# Patient Record
Sex: MALE | Race: BLACK OR AFRICAN AMERICAN | NOT HISPANIC OR LATINO | Employment: FULL TIME | URBAN - METROPOLITAN AREA
[De-identification: names, ages, dates, MRNs, and addresses within clinical notes are randomized per-mention and may not be internally consistent; named-entity substitution may affect disease eponyms.]

---

## 2017-04-05 ENCOUNTER — HOSPITAL ENCOUNTER (OUTPATIENT)
Dept: LAB | Facility: HOSPITAL | Age: 52
Discharge: HOME | End: 2017-04-05
Attending: NURSE PRACTITIONER | Admitting: NURSE PRACTITIONER

## 2017-04-05 LAB
ALBUMIN SERPL-MCNC: 3.8 G/DL (ref 3.5–4.8)
ALBUMIN/GLOB SERPL: 1 {RATIO} (ref 1–1.7)
ALP SERPL-CCNC: 78 IU/L (ref 32–91)
ALT SERPL-CCNC: 55 IU/L (ref 17–63)
ANION GAP SERPL CALC-SCNC: 14.8 MMOL/L (ref 10–20)
AST SERPL-CCNC: 31 IU/L (ref 15–41)
BILIRUB SERPL-MCNC: 0.6 MG/DL (ref 0.3–1.2)
BUN SERPL-MCNC: 12 MG/DL (ref 8–20)
BUN/CREAT SERPL: 12 (ref 6.2–20.3)
CALCIUM SERPL-MCNC: 9 MG/DL (ref 8.9–10.3)
CHLORIDE SERPL-SCNC: 104 MMOL/L (ref 101–111)
CHOLEST SERPL-MCNC: 207 MG/DL
CHOLEST/HDLC SERPL: 3.3 {RATIO}
CONV CO2: 26 MMOL/L (ref 22–32)
CONV LDL CHOLESTEROL DIRECT: 127 MG/DL (ref 0–100)
CONV TOTAL PROTEIN: 7.5 G/DL (ref 6.1–7.9)
CREAT UR-MCNC: 1 MG/DL (ref 0.7–1.2)
GLOBULIN UR ELPH-MCNC: 3.7 G/DL (ref 2.5–3.8)
GLUCOSE SERPL-MCNC: 132 MG/DL (ref 65–99)
HDLC SERPL-MCNC: 63 MG/DL
LDLC/HDLC SERPL: 2 {RATIO}
LIPID INTERPRETATION: ABNORMAL
POTASSIUM SERPL-SCNC: 3.8 MMOL/L (ref 3.6–5.1)
SODIUM SERPL-SCNC: 141 MMOL/L (ref 136–144)
TRIGL SERPL-MCNC: 72 MG/DL
VLDLC SERPL CALC-MCNC: 16.8 MG/DL

## 2017-07-12 ENCOUNTER — HOSPITAL ENCOUNTER (OUTPATIENT)
Dept: LAB | Facility: HOSPITAL | Age: 52
Discharge: HOME | End: 2017-07-12
Attending: NURSE PRACTITIONER | Admitting: NURSE PRACTITIONER

## 2017-07-12 LAB
ALBUMIN SERPL-MCNC: 3.8 G/DL (ref 3.5–4.8)
ALBUMIN/GLOB SERPL: 0.9 {RATIO} (ref 1–1.7)
ALP SERPL-CCNC: 82 IU/L (ref 32–91)
ALT SERPL-CCNC: 43 IU/L (ref 17–63)
ANION GAP SERPL CALC-SCNC: 14.5 MMOL/L (ref 10–20)
AST SERPL-CCNC: 35 IU/L (ref 15–41)
BASOPHILS # BLD AUTO: 0 10*3/UL (ref 0–0.2)
BASOPHILS NFR BLD AUTO: 1 % (ref 0–2)
BILIRUB SERPL-MCNC: 0.9 MG/DL (ref 0.3–1.2)
BUN SERPL-MCNC: 10 MG/DL (ref 8–20)
BUN/CREAT SERPL: 8.3 (ref 6.2–20.3)
CALCIUM SERPL-MCNC: 9 MG/DL (ref 8.9–10.3)
CHLORIDE SERPL-SCNC: 102 MMOL/L (ref 101–111)
CHOLEST SERPL-MCNC: 204 MG/DL
CHOLEST/HDLC SERPL: 3.1 {RATIO}
CONV CO2: 26 MMOL/L (ref 22–32)
CONV LDL CHOLESTEROL DIRECT: 125 MG/DL (ref 0–100)
CONV TOTAL PROTEIN: 7.9 G/DL (ref 6.1–7.9)
CREAT UR-MCNC: 1.2 MG/DL (ref 0.7–1.2)
DIFFERENTIAL METHOD BLD: (no result)
EOSINOPHIL # BLD AUTO: 0.2 10*3/UL (ref 0–0.3)
EOSINOPHIL # BLD AUTO: 5 % (ref 0–3)
ERYTHROCYTE [DISTWIDTH] IN BLOOD BY AUTOMATED COUNT: 14.4 % (ref 11.5–14.5)
GLOBULIN UR ELPH-MCNC: 4.1 G/DL (ref 2.5–3.8)
GLUCOSE SERPL-MCNC: 137 MG/DL (ref 65–99)
HCT VFR BLD AUTO: 40.8 % (ref 40–54)
HDLC SERPL-MCNC: 66 MG/DL
HGB BLD-MCNC: 13.5 G/DL (ref 14–18)
LDLC/HDLC SERPL: 1.9 {RATIO}
LIPID INTERPRETATION: ABNORMAL
LYMPHOCYTES # BLD AUTO: 1.9 10*3/UL (ref 0.8–4.8)
LYMPHOCYTES NFR BLD AUTO: 38 % (ref 18–42)
MCH RBC QN AUTO: 30.6 PG (ref 26–32)
MCHC RBC AUTO-ENTMCNC: 33.1 G/DL (ref 32–36)
MCV RBC AUTO: 92.6 FL (ref 80–94)
MONOCYTES # BLD AUTO: 0.4 10*3/UL (ref 0.1–1.3)
MONOCYTES NFR BLD AUTO: 8 % (ref 2–11)
NEUTROPHILS # BLD AUTO: 2.4 10*3/UL (ref 2.3–8.6)
NEUTROPHILS NFR BLD AUTO: 48 % (ref 50–75)
NRBC BLD AUTO-RTO: 0 /100{WBCS}
NRBC/RBC NFR BLD MANUAL: 0 10*3/UL
PLATELET # BLD AUTO: 268 10*3/UL (ref 150–450)
PMV BLD AUTO: 9.6 FL (ref 7.4–10.4)
POTASSIUM SERPL-SCNC: 3.5 MMOL/L (ref 3.6–5.1)
PSA SERPL-MCNC: 0.87 NG/ML (ref 0–4)
RBC # BLD AUTO: 4.41 10*6/UL (ref 4.6–6)
SODIUM SERPL-SCNC: 139 MMOL/L (ref 136–144)
TRIGL SERPL-MCNC: 85 MG/DL
VLDLC SERPL CALC-MCNC: 13.2 MG/DL
WBC # BLD AUTO: 5 10*3/UL (ref 4.5–11.5)

## 2017-09-01 ENCOUNTER — HOSPITAL ENCOUNTER (OUTPATIENT)
Dept: LAB | Facility: HOSPITAL | Age: 52
Discharge: HOME | End: 2017-09-01
Attending: NURSE PRACTITIONER | Admitting: NURSE PRACTITIONER

## 2017-09-06 ENCOUNTER — HOSPITAL ENCOUNTER (OUTPATIENT)
Dept: LAB | Facility: HOSPITAL | Age: 52
Discharge: HOME | End: 2017-09-06
Attending: NURSE PRACTITIONER | Admitting: NURSE PRACTITIONER

## 2018-01-11 ENCOUNTER — HOSPITAL ENCOUNTER (OUTPATIENT)
Dept: LAB | Facility: HOSPITAL | Age: 53
Discharge: HOME | End: 2018-01-11
Attending: NURSE PRACTITIONER | Admitting: NURSE PRACTITIONER

## 2018-01-11 LAB
BASOPHILS # BLD AUTO: 0 10*3/UL (ref 0–0.2)
BASOPHILS NFR BLD AUTO: 1 % (ref 0–2)
DIFFERENTIAL METHOD BLD: (no result)
EOSINOPHIL # BLD AUTO: 0.2 10*3/UL (ref 0–0.3)
EOSINOPHIL # BLD AUTO: 4 % (ref 0–3)
ERYTHROCYTE [DISTWIDTH] IN BLOOD BY AUTOMATED COUNT: 14.5 % (ref 11.5–14.5)
HCT VFR BLD AUTO: 38.3 % (ref 40–54)
HGB BLD-MCNC: 12.8 G/DL (ref 14–18)
LYMPHOCYTES # BLD AUTO: 2.1 10*3/UL (ref 0.8–4.8)
LYMPHOCYTES NFR BLD AUTO: 37 % (ref 18–42)
MCH RBC QN AUTO: 30.9 PG (ref 26–32)
MCHC RBC AUTO-ENTMCNC: 33.3 G/DL (ref 32–36)
MCV RBC AUTO: 92.8 FL (ref 80–94)
MONOCYTES # BLD AUTO: 0.5 10*3/UL (ref 0.1–1.3)
MONOCYTES NFR BLD AUTO: 8 % (ref 2–11)
NEUTROPHILS # BLD AUTO: 3 10*3/UL (ref 2.3–8.6)
NEUTROPHILS NFR BLD AUTO: 50 % (ref 50–75)
NRBC BLD AUTO-RTO: 0 /100{WBCS}
NRBC/RBC NFR BLD MANUAL: 0 10*3/UL
PLATELET # BLD AUTO: 245 10*3/UL (ref 150–450)
PMV BLD AUTO: 9.7 FL (ref 7.4–10.4)
RBC # BLD AUTO: 4.13 10*6/UL (ref 4.6–6)
WBC # BLD AUTO: 5.7 10*3/UL (ref 4.5–11.5)

## 2018-01-19 ENCOUNTER — CLINICAL SUPPORT (OUTPATIENT)
Dept: ONCOLOGY | Facility: HOSPITAL | Age: 53
End: 2018-01-19

## 2018-01-19 ENCOUNTER — HOSPITAL ENCOUNTER (OUTPATIENT)
Dept: ONCOLOGY | Facility: HOSPITAL | Age: 53
Discharge: HOME | End: 2018-01-19
Attending: INTERNAL MEDICINE | Admitting: INTERNAL MEDICINE

## 2018-01-19 ENCOUNTER — HOSPITAL ENCOUNTER (OUTPATIENT)
Dept: ONCOLOGY | Facility: CLINIC | Age: 53
Setting detail: INFUSION SERIES
Discharge: HOME | End: 2018-01-19
Attending: INTERNAL MEDICINE | Admitting: INTERNAL MEDICINE

## 2018-01-19 LAB
IRON SATN MFR SERPL: 20 % (ref 20–50)
IRON SERPL-MCNC: 79 UG/DL (ref 45–182)
MAGNESIUM UR-MCNC: 2.15 % (ref 0.5–1.5)
RETICS/RBC NFR MANUAL: 0.08 10*6/UL
TIBC SERPL-MCNC: 392 UG/DL (ref 228–428)
TRANSFERRIN SERPL-MCNC: 280 MG/DL (ref 180–330)

## 2018-01-19 NOTE — PROGRESS NOTES
PATIENTS ONCOLOGY RECORD LOCATED IN [facility]      Subjective     Name:  HAYES COX     Date:  2018  Address:  [address] [id number]  Home: [phone]  :  1965 AGE:  52 y.o.        RECORDS OBTAINED:  Patients Oncology Record is located in [facility]

## 2018-01-22 LAB — HAPTOGLOB SERPL-MCNC: 181 MG/DL (ref 36–195)

## 2018-02-01 ENCOUNTER — HOSPITAL ENCOUNTER (OUTPATIENT)
Dept: ONCOLOGY | Facility: CLINIC | Age: 53
Setting detail: INFUSION SERIES
Discharge: HOME | End: 2018-02-01
Attending: INTERNAL MEDICINE | Admitting: INTERNAL MEDICINE

## 2018-02-01 ENCOUNTER — HOSPITAL ENCOUNTER (OUTPATIENT)
Dept: ONCOLOGY | Facility: HOSPITAL | Age: 53
Discharge: HOME | End: 2018-02-01
Attending: INTERNAL MEDICINE | Admitting: INTERNAL MEDICINE

## 2018-02-01 ENCOUNTER — CLINICAL SUPPORT (OUTPATIENT)
Dept: ONCOLOGY | Facility: HOSPITAL | Age: 53
End: 2018-02-01

## 2018-02-01 LAB
ALBUMIN SERPL-MCNC: 3.6 G/DL (ref 3.5–4.8)
ALBUMIN SERPL-MCNC: 3.9 G/DL (ref 3.5–4.8)
ALBUMIN/GLOB SERPL: 1 {RATIO} (ref 1–1.7)
ALP SERPL-CCNC: 76 IU/L (ref 32–91)
ALPHA1 GLOB FLD ELPH-MCNC: 0.2 GM/DL (ref 0.1–0.4)
ALPHA2 GLOB SERPL ELPH-MCNC: 0.8 GM/DL (ref 0.5–1)
ALT SERPL-CCNC: 61 IU/L (ref 17–63)
ANION GAP SERPL CALC-SCNC: 10.7 MMOL/L (ref 10–20)
AST SERPL-CCNC: 37 IU/L (ref 15–41)
B-GLOBULIN SERPL ELPH-MCNC: 1.3 GM/DL (ref 0.7–1.4)
BILIRUB SERPL-MCNC: 0.7 MG/DL (ref 0.3–1.2)
BUN SERPL-MCNC: 15 MG/DL (ref 8–20)
BUN/CREAT SERPL: 15 (ref 6.2–20.3)
CALCIUM SERPL-MCNC: 9.1 MG/DL (ref 8.9–10.3)
CHLORIDE SERPL-SCNC: 105 MMOL/L (ref 101–111)
CONV CO2: 27 MMOL/L (ref 22–32)
CONV TOTAL PROTEIN: 7.7 G/DL (ref 6.1–7.9)
CONV TOTAL PROTEIN: 7.9 G/DL (ref 6.1–7.9)
CREAT UR-MCNC: 1 MG/DL (ref 0.7–1.2)
GAMMA GLOB SERPL ELPH-MCNC: 1.8 GM/DL (ref 0.6–1.6)
GLOBULIN UR ELPH-MCNC: 4 G/DL (ref 2.5–3.8)
GLUCOSE SERPL-MCNC: 131 MG/DL (ref 65–99)
INSULIN SERPL-ACNC: ABNORMAL U[IU]/ML
POTASSIUM SERPL-SCNC: 3.7 MMOL/L (ref 3.6–5.1)
SODIUM SERPL-SCNC: 139 MMOL/L (ref 136–144)

## 2018-02-01 NOTE — PROGRESS NOTES
PATIENTS ONCOLOGY RECORD LOCATED IN [facility]      Subjective     Name:  HAYES COX     Date:  2018  Address:  [address] [place] [id number]  Home: [phone]  :  1965 AGE:  52 y.o.        RECORDS OBTAINED:  Patients Oncology Record is located in [facility]

## 2018-03-30 ENCOUNTER — HOSPITAL ENCOUNTER (OUTPATIENT)
Dept: PREOP | Facility: HOSPITAL | Age: 53
Setting detail: HOSPITAL OUTPATIENT SURGERY
Discharge: HOME | End: 2018-03-30
Attending: INTERNAL MEDICINE | Admitting: INTERNAL MEDICINE

## 2018-03-30 ENCOUNTER — ON CAMPUS - OUTPATIENT (OUTPATIENT)
Dept: URBAN - METROPOLITAN AREA HOSPITAL 85 | Facility: HOSPITAL | Age: 53
End: 2018-03-30
Payer: COMMERCIAL

## 2018-03-30 DIAGNOSIS — D12.5 BENIGN NEOPLASM OF SIGMOID COLON: ICD-10-CM

## 2018-03-30 DIAGNOSIS — D12.4 BENIGN NEOPLASM OF DESCENDING COLON: ICD-10-CM

## 2018-03-30 DIAGNOSIS — D50.9 IRON DEFICIENCY ANEMIA, UNSPECIFIED: ICD-10-CM

## 2018-03-30 DIAGNOSIS — K63.5 POLYP OF COLON: ICD-10-CM

## 2018-03-30 DIAGNOSIS — Z86.010 PERSONAL HISTORY OF COLONIC POLYPS: ICD-10-CM

## 2018-03-30 PROCEDURE — 45380 COLONOSCOPY AND BIOPSY: CPT | Performed by: INTERNAL MEDICINE

## 2018-04-11 ENCOUNTER — HOSPITAL ENCOUNTER (OUTPATIENT)
Dept: ONCOLOGY | Facility: CLINIC | Age: 53
Setting detail: INFUSION SERIES
Discharge: HOME | End: 2018-04-11
Attending: INTERNAL MEDICINE | Admitting: INTERNAL MEDICINE

## 2018-04-11 ENCOUNTER — CLINICAL SUPPORT (OUTPATIENT)
Dept: ONCOLOGY | Facility: HOSPITAL | Age: 53
End: 2018-04-11

## 2018-04-11 NOTE — PROGRESS NOTES
PATIENTS ONCOLOGY RECORD LOCATED IN [facility]      Subjective     Name:  HAYES COX     Date:  2018  Address:  [address] [place] [id number]  Home: [phone]  :  1965 AGE:  52 y.o.        RECORDS OBTAINED:  Patients Oncology Record is located in [facility]

## 2019-03-20 ENCOUNTER — HOSPITAL ENCOUNTER (OUTPATIENT)
Dept: GENERAL RADIOLOGY | Facility: HOSPITAL | Age: 54
Discharge: HOME | End: 2019-03-20
Attending: ALLERGY & IMMUNOLOGY | Admitting: ALLERGY & IMMUNOLOGY

## 2019-03-26 ENCOUNTER — HOSPITAL ENCOUNTER (OUTPATIENT)
Dept: LAB | Facility: HOSPITAL | Age: 54
Discharge: HOME | End: 2019-03-26
Attending: NURSE PRACTITIONER | Admitting: NURSE PRACTITIONER

## 2019-03-26 LAB
ALBUMIN SERPL-MCNC: 3.7 G/DL (ref 3.5–4.8)
ALBUMIN/GLOB SERPL: 0.9 {RATIO} (ref 1–1.7)
ALP SERPL-CCNC: 99 IU/L (ref 32–91)
ALT SERPL-CCNC: 150 IU/L (ref 17–63)
ANION GAP SERPL CALC-SCNC: 14 MMOL/L (ref 10–20)
AST SERPL-CCNC: 77 IU/L (ref 15–41)
BASOPHILS # BLD AUTO: 0 10*3/UL (ref 0–0.2)
BASOPHILS NFR BLD AUTO: 1 % (ref 0–2)
BILIRUB SERPL-MCNC: 1.1 MG/DL (ref 0.3–1.2)
BUN SERPL-MCNC: 14 MG/DL (ref 8–20)
BUN/CREAT SERPL: 12.7 (ref 6.2–20.3)
CALCIUM SERPL-MCNC: 8.8 MG/DL (ref 8.9–10.3)
CHLORIDE SERPL-SCNC: 103 MMOL/L (ref 101–111)
CHOLEST SERPL-MCNC: 197 MG/DL
CHOLEST/HDLC SERPL: 2.9 {RATIO}
CONV CO2: 26 MMOL/L (ref 22–32)
CONV LDL CHOLESTEROL DIRECT: 110 MG/DL (ref 0–100)
CONV TOTAL PROTEIN: 7.6 G/DL (ref 6.1–7.9)
CREAT UR-MCNC: 1.1 MG/DL (ref 0.7–1.2)
DIFFERENTIAL METHOD BLD: (no result)
EOSINOPHIL # BLD AUTO: 0.1 10*3/UL (ref 0–0.3)
EOSINOPHIL # BLD AUTO: 2 % (ref 0–3)
ERYTHROCYTE [DISTWIDTH] IN BLOOD BY AUTOMATED COUNT: 14.1 % (ref 11.5–14.5)
GLOBULIN UR ELPH-MCNC: 3.9 G/DL (ref 2.5–3.8)
GLUCOSE SERPL-MCNC: 166 MG/DL (ref 65–99)
HCT VFR BLD AUTO: 42.3 % (ref 40–54)
HDLC SERPL-MCNC: 67 MG/DL
HGB BLD-MCNC: 14 G/DL (ref 14–18)
LDLC/HDLC SERPL: 1.6 {RATIO}
LIPID INTERPRETATION: ABNORMAL
LYMPHOCYTES # BLD AUTO: 1.8 10*3/UL (ref 0.8–4.8)
LYMPHOCYTES NFR BLD AUTO: 31 % (ref 18–42)
MCH RBC QN AUTO: 30.6 PG (ref 26–32)
MCHC RBC AUTO-ENTMCNC: 33 G/DL (ref 32–36)
MCV RBC AUTO: 92.7 FL (ref 80–94)
MONOCYTES # BLD AUTO: 0.4 10*3/UL (ref 0.1–1.3)
MONOCYTES NFR BLD AUTO: 7 % (ref 2–11)
NEUTROPHILS # BLD AUTO: 3.5 10*3/UL (ref 2.3–8.6)
NEUTROPHILS NFR BLD AUTO: 59 % (ref 50–75)
NRBC BLD AUTO-RTO: 0 /100{WBCS}
NRBC/RBC NFR BLD MANUAL: 0 10*3/UL
PLATELET # BLD AUTO: 248 10*3/UL (ref 150–450)
PMV BLD AUTO: 10.4 FL (ref 7.4–10.4)
POTASSIUM SERPL-SCNC: 4 MMOL/L (ref 3.6–5.1)
RBC # BLD AUTO: 4.57 10*6/UL (ref 4.6–6)
SODIUM SERPL-SCNC: 139 MMOL/L (ref 136–144)
TRIGL SERPL-MCNC: 177 MG/DL
VLDLC SERPL CALC-MCNC: 20.2 MG/DL
WBC # BLD AUTO: 5.9 10*3/UL (ref 4.5–11.5)

## 2019-06-18 RX ORDER — LISINOPRIL 10 MG/1
TABLET ORAL
Qty: 90 TABLET | Refills: 0 | OUTPATIENT
Start: 2019-06-18

## 2019-06-18 RX ORDER — AMLODIPINE BESYLATE 5 MG/1
TABLET ORAL
Qty: 90 TABLET | Refills: 0 | OUTPATIENT
Start: 2019-06-18

## 2019-06-20 ENCOUNTER — TRANSCRIBE ORDERS (OUTPATIENT)
Dept: FAMILY MEDICINE CLINIC | Facility: CLINIC | Age: 54
End: 2019-06-20

## 2019-06-20 ENCOUNTER — LAB (OUTPATIENT)
Dept: LAB | Facility: HOSPITAL | Age: 54
End: 2019-06-20

## 2019-06-20 DIAGNOSIS — R74.8 ELEVATED LIVER ENZYMES: ICD-10-CM

## 2019-06-20 DIAGNOSIS — R74.8 ELEVATED LIVER ENZYMES: Primary | ICD-10-CM

## 2019-06-20 LAB
ALBUMIN SERPL-MCNC: 3.8 G/DL (ref 3.5–4.8)
ALBUMIN/GLOB SERPL: 1.2 G/DL (ref 1–1.7)
ALP SERPL-CCNC: 88 U/L (ref 32–91)
ALT SERPL W P-5'-P-CCNC: 72 U/L (ref 17–63)
ANION GAP SERPL CALCULATED.3IONS-SCNC: 8 MMOL/L (ref 10–20)
AST SERPL-CCNC: 47 U/L (ref 15–41)
BILIRUB SERPL-MCNC: 0.9 MG/DL (ref 0.3–1.2)
BUN BLD-MCNC: 15 MG/DL (ref 8–20)
BUN/CREAT SERPL: 12.5 (ref 6.2–20.3)
CALCIUM SPEC-SCNC: 9.2 MG/DL (ref 8.9–10.3)
CHLORIDE SERPL-SCNC: 105 MMOL/L (ref 101–111)
CO2 SERPL-SCNC: 25 MMOL/L (ref 22–32)
CREAT BLD-MCNC: 1.2 MG/DL (ref 0.7–1.2)
GFR SERPL CREATININE-BSD FRML MDRD: 76 ML/MIN/1.73
GLOBULIN UR ELPH-MCNC: 3.2 GM/DL (ref 2.5–3.8)
GLUCOSE BLD-MCNC: 151 MG/DL (ref 65–99)
POTASSIUM BLD-SCNC: 4.3 MMOL/L (ref 3.6–5.1)
PROT SERPL-MCNC: 7 G/DL (ref 6.1–7.9)
SODIUM BLD-SCNC: 138 MMOL/L (ref 136–144)

## 2019-06-20 PROCEDURE — 80053 COMPREHEN METABOLIC PANEL: CPT

## 2019-06-20 PROCEDURE — 36415 COLL VENOUS BLD VENIPUNCTURE: CPT

## 2019-06-22 ENCOUNTER — PATIENT MESSAGE (OUTPATIENT)
Dept: FAMILY MEDICINE CLINIC | Facility: CLINIC | Age: 54
End: 2019-06-22

## 2019-06-22 RX ORDER — AMLODIPINE BESYLATE 5 MG/1
1 TABLET ORAL DAILY
COMMUNITY
Start: 2019-02-01 | End: 2019-06-22 | Stop reason: SDUPTHER

## 2019-06-22 RX ORDER — AMLODIPINE BESYLATE 5 MG/1
5 TABLET ORAL DAILY
Qty: 90 TABLET | Refills: 0 | Status: SHIPPED | OUTPATIENT
Start: 2019-06-22 | End: 2019-09-18 | Stop reason: SDUPTHER

## 2019-06-22 RX ORDER — LISINOPRIL 10 MG/1
1 TABLET ORAL DAILY
COMMUNITY
Start: 2018-10-25 | End: 2019-06-22 | Stop reason: SDUPTHER

## 2019-06-22 RX ORDER — LISINOPRIL 10 MG/1
10 TABLET ORAL DAILY
Qty: 90 TABLET | Refills: 0 | Status: SHIPPED | OUTPATIENT
Start: 2019-06-22 | End: 2019-09-18 | Stop reason: SDUPTHER

## 2019-06-22 NOTE — TELEPHONE ENCOUNTER
From: Angelo Nicole  To: Marisela Lamb APRN  Sent: 6/22/2019 11:56 AM EDT  Subject: Prescription Question    Hello, I called in my blood pressure medication refill last week and have not heard back from [facility] yet. I know you were waiting for my lab results before calling into pharmacy. I did complete them. My blood pressure and associated headaches are getting worse today but manageable.

## 2019-08-22 RX ORDER — MONTELUKAST SODIUM 10 MG/1
TABLET ORAL
Qty: 90 TABLET | Refills: 0 | Status: SHIPPED | OUTPATIENT
Start: 2019-08-22 | End: 2020-03-19

## 2019-08-22 RX ORDER — RANITIDINE 150 MG/1
TABLET ORAL
Qty: 60 TABLET | Refills: 2 | Status: SHIPPED | OUTPATIENT
Start: 2019-08-22 | End: 2020-04-23

## 2019-09-11 PROBLEM — D12.6 ADENOMATOUS POLYP OF COLON: Status: ACTIVE | Noted: 2019-03-19

## 2019-09-11 PROBLEM — R53.83 FATIGUE: Status: ACTIVE | Noted: 2018-05-25

## 2019-09-11 PROBLEM — D64.9 ANEMIA: Status: ACTIVE | Noted: 2017-07-18

## 2019-09-11 PROBLEM — R74.8 ELEVATED LIVER ENZYMES: Status: ACTIVE | Noted: 2019-03-26

## 2019-09-11 PROBLEM — E87.6 HYPOKALEMIA: Status: ACTIVE | Noted: 2017-07-18

## 2019-09-11 RX ORDER — MOMETASONE FUROATE 50 UG/1
SPRAY, METERED NASAL
COMMUNITY
Start: 2017-04-14

## 2019-09-11 RX ORDER — NITROGLYCERIN 0.4 MG/1
TABLET SUBLINGUAL
COMMUNITY
Start: 2016-06-03

## 2019-09-11 RX ORDER — ALBUTEROL SULFATE 90 UG/1
AEROSOL, METERED RESPIRATORY (INHALATION)
COMMUNITY
Start: 2016-10-13 | End: 2020-03-30

## 2019-09-11 RX ORDER — EPINEPHRINE 0.3 MG/.3ML
INJECTION SUBCUTANEOUS
COMMUNITY
Start: 2016-06-03

## 2019-09-11 RX ORDER — CETIRIZINE HYDROCHLORIDE 10 MG/1
TABLET ORAL EVERY 24 HOURS
COMMUNITY
Start: 2016-06-03

## 2019-09-18 RX ORDER — AMLODIPINE BESYLATE 5 MG/1
TABLET ORAL
Qty: 90 TABLET | Refills: 0 | Status: SHIPPED | OUTPATIENT
Start: 2019-09-18 | End: 2019-12-24

## 2019-09-18 RX ORDER — LISINOPRIL 10 MG/1
TABLET ORAL
Qty: 90 TABLET | Refills: 0 | Status: SHIPPED | OUTPATIENT
Start: 2019-09-18 | End: 2019-12-24

## 2019-10-18 ENCOUNTER — OFFICE VISIT (OUTPATIENT)
Dept: FAMILY MEDICINE CLINIC | Facility: CLINIC | Age: 54
End: 2019-10-18

## 2019-10-18 VITALS
BODY MASS INDEX: 35.08 KG/M2 | HEIGHT: 71 IN | WEIGHT: 250.6 LBS | HEART RATE: 76 BPM | RESPIRATION RATE: 18 BRPM | TEMPERATURE: 98.2 F | SYSTOLIC BLOOD PRESSURE: 145 MMHG | OXYGEN SATURATION: 100 % | DIASTOLIC BLOOD PRESSURE: 88 MMHG

## 2019-10-18 DIAGNOSIS — I10 ESSENTIAL HYPERTENSION: Primary | ICD-10-CM

## 2019-10-18 DIAGNOSIS — E11.9 CONTROLLED TYPE 2 DIABETES MELLITUS WITHOUT COMPLICATION, WITHOUT LONG-TERM CURRENT USE OF INSULIN (HCC): ICD-10-CM

## 2019-10-18 DIAGNOSIS — G47.33 OBSTRUCTIVE SLEEP APNEA: ICD-10-CM

## 2019-10-18 DIAGNOSIS — R80.9 MICROALBUMINURIA: ICD-10-CM

## 2019-10-18 DIAGNOSIS — R53.83 FATIGUE, UNSPECIFIED TYPE: ICD-10-CM

## 2019-10-18 DIAGNOSIS — K76.0 FATTY LIVER: ICD-10-CM

## 2019-10-18 DIAGNOSIS — E78.2 MIXED HYPERLIPIDEMIA: ICD-10-CM

## 2019-10-18 LAB — HBA1C MFR BLD: 6.7 %

## 2019-10-18 PROCEDURE — 83036 HEMOGLOBIN GLYCOSYLATED A1C: CPT | Performed by: NURSE PRACTITIONER

## 2019-10-18 PROCEDURE — 99214 OFFICE O/P EST MOD 30 MIN: CPT | Performed by: NURSE PRACTITIONER

## 2019-10-18 NOTE — PROGRESS NOTES
Chief Complaint   Patient presents with   • Diabetes   • Hyperlipidemia   • Hypertension        Subjective     Angelo Nicole  has a past medical history of Anemia, Asthma, Coronary vasospasm (CMS/HCC), Diabetes type 2, controlled (CMS/HCC), Fatty liver, GERD (gastroesophageal reflux disease), H/O seasonal allergies, Hemangioma of liver, adenomatous colonic polyps, Hyperlipidemia, Hypertension, Obstructive sleep apnea, and Wasp sting-induced anaphylaxis.    Diabetes   He presents for his follow-up diabetic visit. He has type 2 diabetes mellitus. The initial diagnosis of diabetes was made 8 years ago. Pertinent negatives for hypoglycemia include no confusion, dizziness, headaches, hunger, mood changes, nervousness/anxiousness, pallor, seizures, speech difficulty, sweats or tremors. Associated symptoms include fatigue and weakness. Pertinent negatives for diabetes include no blurred vision, no chest pain, no foot paresthesias, no foot ulcerations, no polydipsia, no polyphagia, no polyuria, no visual change and no weight loss. Pertinent negatives for hypoglycemia complications include no blackouts, no hospitalization, no nocturnal hypoglycemia, no required assistance and no required glucagon injection. Symptoms are improving. Pertinent negatives for diabetic complications include no CVA, heart disease, impotence, nephropathy, peripheral neuropathy, PVD or retinopathy. Risk factors for coronary artery disease include hypertension, obesity, sedentary lifestyle, stress, male sex, dyslipidemia, family history and diabetes mellitus. Current diabetic treatment includes diet and oral agent (monotherapy). He is compliant with treatment most of the time. His weight is increasing steadily. He is following a generally healthy diet. When asked about meal planning, he reported none. He has not had a previous visit with a dietitian. He rarely participates in exercise. He monitors blood glucose at home 1-2 x per week. Blood glucose  monitoring compliance is fair. His home blood glucose trend is decreasing steadily. His breakfast blood glucose is taken after 10 am. His breakfast blood glucose range is generally  mg/dl. His highest blood glucose is 140-180 mg/dl. An ACE inhibitor/angiotensin II receptor blocker is being taken. He does not see a podiatrist.Eye exam is not current.   Hypertension   This is a chronic problem. The current episode started more than 1 year ago. The problem has been gradually improving since onset. The problem is uncontrolled. Pertinent negatives include no blurred vision, chest pain, headaches, palpitations, shortness of breath or sweats. Current antihypertension treatment includes calcium channel blockers and ACE inhibitors. The current treatment provides significant improvement. There is no history of CVA, PVD or retinopathy.   Hyperlipidemia   This is a chronic problem. The current episode started more than 1 year ago. The problem is uncontrolled. Recent lipid tests were reviewed and are high. Exacerbating diseases include diabetes and obesity. Pertinent negatives include no chest pain, myalgias or shortness of breath. He is currently on no antihyperlipidemic treatment.       PHQ-2 Depression Screening  Little interest or pleasure in doing things? 0   Feeling down, depressed, or hopeless? 0   PHQ-2 Total Score 0       Allergies   Allergen Reactions   • Fluticasone Other (See Comments)         Current Outpatient Medications:   •  albuterol sulfate HFA (VENTOLIN HFA) 108 (90 Base) MCG/ACT inhaler, VENTOLIN  (90 Base) MCG/ACT AERS, Disp: , Rfl:   •  amLODIPine (NORVASC) 5 MG tablet, TAKE ONE TABLET BY MOUTH DAILY, Disp: 90 tablet, Rfl: 0  •  aspirin 81 MG tablet, ASPIRIN 81 MG ORAL TABLET, Disp: , Rfl:   •  cetirizine (ZYRTEC ALLERGY) 10 MG tablet, Daily., Disp: , Rfl:   •  EPINEPHrine (EPIPEN 2-JEOVANNY) 0.3 MG/0.3ML solution auto-injector injection, EPIPEN 2-JEOVANNY 0.3 MG/0.3ML SOAJ, Disp: , Rfl:   •  lisinopril  (PRINIVIL,ZESTRIL) 10 MG tablet, TAKE ONE TABLET BY MOUTH DAILY, Disp: 90 tablet, Rfl: 0  •  metFORMIN (GLUCOPHAGE) 500 MG tablet, TAKE ONE TABLET BY MOUTH DAILY WITH A MEAL, Disp: 90 tablet, Rfl: 0  •  mometasone (NASONEX) 50 MCG/ACT nasal spray, NASONEX 50 MCG/ACT SUSP, Disp: , Rfl:   •  mometasone-formoterol (DULERA) 200-5 MCG/ACT inhaler, DULERA 200-5 MCG/ACT AERO, Disp: , Rfl:   •  montelukast (SINGULAIR) 10 MG tablet, TAKE ONE TABLET BY MOUTH EVERY EVENING, Disp: 90 tablet, Rfl: 0  •  nitroglycerin (NITROSTAT) 0.4 MG SL tablet, NITROSTAT 0.4 MG SUBL, Disp: , Rfl:   •  raNITIdine (ZANTAC) 150 MG tablet, TAKE ONE TABLET BY MOUTH TWICE A DAY, Disp: 60 tablet, Rfl: 2    Past Medical History:   Diagnosis Date   • Anemia    • Asthma    • Coronary vasospasm (CMS/HCC)    • Diabetes type 2, controlled (CMS/HCC)    • Fatty liver    • GERD (gastroesophageal reflux disease)    • H/O seasonal allergies    • Hemangioma of liver    • Hx of adenomatous colonic polyps    • Hyperlipidemia    • Hypertension    • Obstructive sleep apnea    • Wasp sting-induced anaphylaxis        Past Surgical History:   Procedure Laterality Date   • CARDIAC CATHETERIZATION  2011   • COLONOSCOPY  2013    Colon polyps       Social History     Socioeconomic History   • Marital status:      Spouse name: Not on file   • Number of children: Not on file   • Years of education: Not on file   • Highest education level: Not on file   Tobacco Use   • Smoking status: Never Smoker   • Smokeless tobacco: Never Used   Substance and Sexual Activity   • Alcohol use: No     Frequency: Never   • Drug use: No     Family History   Problem Relation Age of Onset   • Diabetes Mother    • Heart disease Mother    • Hypertension Mother    • Stroke Mother    • Kidney disease Mother    • Colon cancer Father        Family history, surgical history, past medical history, Allergies and med's reviewed with patient today and updated in [facility] EMR.     ROS:  Review of Systems  "  Constitutional: Positive for fatigue. Negative for activity change, appetite change, diaphoresis, unexpected weight gain and unexpected weight loss.   Eyes: Negative for blurred vision and visual disturbance.   Respiratory: Negative for apnea, cough, shortness of breath and wheezing.    Cardiovascular: Negative for chest pain, palpitations and leg swelling.   Gastrointestinal: Negative for abdominal pain, constipation, diarrhea, nausea and vomiting.   Endocrine: Negative for cold intolerance, heat intolerance, polydipsia, polyphagia and polyuria.   Genitourinary: Negative for frequency and impotence.   Musculoskeletal: Negative for myalgias.   Skin: Negative for pallor and rash.   Neurological: Positive for weakness. Negative for dizziness, tremors, seizures, syncope, speech difficulty, numbness, headache and confusion.   Psychiatric/Behavioral: Positive for stress. Negative for depressed mood. The patient is not nervous/anxious.      OBJECTIVE:  Vitals:    10/18/19 0810   BP: 145/88   BP Location: Right arm   Patient Position: Sitting   Cuff Size: Large Adult   Pulse: 76   Resp: 18   Temp: 98.2 °F (36.8 °C)   TempSrc: Oral   SpO2: 100%   Weight: 114 kg (250 lb 9.6 oz)   Height: 180.3 cm (71\")     Body mass index is 34.95 kg/m².    Physical Exam   Constitutional: He is oriented to person, place, and time. He appears well-developed and well-nourished. He is active. No distress.   HENT:   Head: Normocephalic and atraumatic.   Right Ear: External ear and ear canal normal. No drainage. Tympanic membrane is not injected, not erythematous and not retracted.   Left Ear: External ear and ear canal normal. No drainage. Tympanic membrane is not injected, not erythematous and not retracted.   Nose: Nose normal. No mucosal edema or rhinorrhea. Right sinus exhibits no maxillary sinus tenderness and no frontal sinus tenderness. Left sinus exhibits no maxillary sinus tenderness and no frontal sinus tenderness.   Mouth/Throat: " Uvula is midline, oropharynx is clear and moist and mucous membranes are normal. No oral lesions. No oropharyngeal exudate, posterior oropharyngeal edema or posterior oropharyngeal erythema.   Eyes: Conjunctivae, EOM and lids are normal. Pupils are equal, round, and reactive to light. Right eye exhibits no discharge. Left eye exhibits no discharge.   Neck: Trachea normal and normal range of motion. Neck supple. Carotid bruit is not present. No tracheal deviation present. No thyromegaly present.   Cardiovascular: Normal rate, regular rhythm, normal heart sounds and intact distal pulses. Exam reveals no gallop and no friction rub.   No murmur heard.  Pulmonary/Chest: Effort normal and breath sounds normal. No respiratory distress. He has no wheezes. He has no rales.   Abdominal: Soft. Bowel sounds are normal. There is no hepatosplenomegaly. There is no tenderness. No hernia.   Musculoskeletal: Normal range of motion. He exhibits no edema or deformity.   Lymphadenopathy:     He has no cervical adenopathy.   Neurological: He is alert and oriented to person, place, and time.   Skin: Skin is warm and dry. No lesion and no rash noted. He is not diaphoretic.   Psychiatric: He has a normal mood and affect. His behavior is normal. Judgment and thought content normal.       Office Visit on 10/18/2019   Component Date Value Ref Range Status   • Hemoglobin A1C 10/18/2019 6.7  % Final       ASSESSMENT/ PLAN:    Diagnoses and all orders for this visit:    1. Essential hypertension (Primary)  Comments:  Mild elevation. Normal readings at home. If elevated at next visit will increase Lisinopril dose.   Orders:  -     CBC & Differential; Future  -     Comprehensive Metabolic Panel; Future    2. Controlled type 2 diabetes mellitus without complication, without long-term current use of insulin (CMS/[facility])  Comments:  Good control on current treatment plan.   Orders:  -     POC Glycosylated Hemoglobin (Hb A1C)  -     Microalbumin /  Creatinine Urine Ratio - Urine, Clean Catch; Future    3. Mixed hyperlipidemia  Comments:  Reviewed 2/19 lipids. Recommended statin - he is agreeable. Will wait to see what LFT's show today.     4. Obstructive sleep apnea  Comments:  Reports consistent CPAP use.     5. Fatty liver  Comments:  Encouraged healthy diet, regular exercise, weight loss.    Will call with lab results and further recommendations.     6. Fatigue, unspecified type  Comments:  Likely multifactorial.   He will f/up with Pulmonology to have CPAP settings checked.     7. Microalbuminuria  Comments:  Unable to void in office today. He will have this rechecked at [facility] lab.         Orders Placed Today:     No orders of the defined types were placed in this encounter.       Management Plan:     An After Visit Summary was printed and given to the patient at discharge.    Follow-up: Return in about 3 months (around 1/18/2020) for Recheck diabetes, hyperlipidemia, hypertension.    VIRGINIA Carlton 10/18/2019 8:59 AM  This note was electronically signed.

## 2019-10-23 ENCOUNTER — RESULTS ENCOUNTER (OUTPATIENT)
Dept: FAMILY MEDICINE CLINIC | Facility: CLINIC | Age: 54
End: 2019-10-23

## 2019-10-23 DIAGNOSIS — I10 ESSENTIAL HYPERTENSION: ICD-10-CM

## 2019-10-23 DIAGNOSIS — E11.9 CONTROLLED TYPE 2 DIABETES MELLITUS WITHOUT COMPLICATION, WITHOUT LONG-TERM CURRENT USE OF INSULIN (HCC): ICD-10-CM

## 2019-12-06 RX ORDER — MOMETASONE FUROATE AND FORMOTEROL FUMARATE DIHYDRATE 200; 5 UG/1; UG/1
AEROSOL RESPIRATORY (INHALATION)
Qty: 13 G | Refills: 3 | Status: SHIPPED | OUTPATIENT
Start: 2019-12-06 | End: 2020-03-11

## 2019-12-24 RX ORDER — AMLODIPINE BESYLATE 5 MG/1
TABLET ORAL
Qty: 90 TABLET | Refills: 0 | Status: SHIPPED | OUTPATIENT
Start: 2019-12-24 | End: 2020-03-30

## 2019-12-24 RX ORDER — LISINOPRIL 10 MG/1
TABLET ORAL
Qty: 90 TABLET | Refills: 0 | Status: SHIPPED | OUTPATIENT
Start: 2019-12-24 | End: 2020-03-30

## 2020-03-09 ENCOUNTER — APPOINTMENT (OUTPATIENT)
Dept: LAB | Facility: HOSPITAL | Age: 55
End: 2020-03-09

## 2020-03-09 DIAGNOSIS — I10 ESSENTIAL HYPERTENSION: Primary | ICD-10-CM

## 2020-03-09 LAB
ALBUMIN SERPL-MCNC: 4.6 G/DL (ref 3.5–5.2)
ALBUMIN UR-MCNC: 25.4 MG/DL
ALBUMIN/GLOB SERPL: 1.4 G/DL
ALP SERPL-CCNC: 127 U/L (ref 39–117)
ALT SERPL W P-5'-P-CCNC: 76 U/L (ref 1–41)
ANION GAP SERPL CALCULATED.3IONS-SCNC: 13.5 MMOL/L (ref 5–15)
AST SERPL-CCNC: 43 U/L (ref 1–40)
BASOPHILS # BLD AUTO: 0.03 10*3/MM3 (ref 0–0.2)
BASOPHILS NFR BLD AUTO: 0.5 % (ref 0–1.5)
BILIRUB SERPL-MCNC: 0.5 MG/DL (ref 0.2–1.2)
BUN BLD-MCNC: 16 MG/DL (ref 6–20)
BUN/CREAT SERPL: 14.7 (ref 7–25)
CALCIUM SPEC-SCNC: 9.8 MG/DL (ref 8.6–10.5)
CHLORIDE SERPL-SCNC: 99 MMOL/L (ref 98–107)
CO2 SERPL-SCNC: 24.5 MMOL/L (ref 22–29)
CREAT BLD-MCNC: 1.09 MG/DL (ref 0.76–1.27)
CREAT UR-MCNC: 80.7 MG/DL
DEPRECATED RDW RBC AUTO: 41.5 FL (ref 37–54)
EOSINOPHIL # BLD AUTO: 0.16 10*3/MM3 (ref 0–0.4)
EOSINOPHIL NFR BLD AUTO: 2.8 % (ref 0.3–6.2)
ERYTHROCYTE [DISTWIDTH] IN BLOOD BY AUTOMATED COUNT: 12.7 % (ref 12.3–15.4)
GFR SERPL CREATININE-BSD FRML MDRD: 85 ML/MIN/1.73
GLOBULIN UR ELPH-MCNC: 3.4 GM/DL
GLUCOSE BLD-MCNC: 305 MG/DL (ref 65–99)
HCT VFR BLD AUTO: 40.5 % (ref 37.5–51)
HGB BLD-MCNC: 13.9 G/DL (ref 13–17.7)
IMM GRANULOCYTES # BLD AUTO: 0.02 10*3/MM3 (ref 0–0.05)
IMM GRANULOCYTES NFR BLD AUTO: 0.3 % (ref 0–0.5)
LYMPHOCYTES # BLD AUTO: 2.05 10*3/MM3 (ref 0.7–3.1)
LYMPHOCYTES NFR BLD AUTO: 35.7 % (ref 19.6–45.3)
MCH RBC QN AUTO: 31 PG (ref 26.6–33)
MCHC RBC AUTO-ENTMCNC: 34.3 G/DL (ref 31.5–35.7)
MCV RBC AUTO: 90.2 FL (ref 79–97)
MICROALBUMIN/CREAT UR: 314.7 MG/G
MONOCYTES # BLD AUTO: 0.36 10*3/MM3 (ref 0.1–0.9)
MONOCYTES NFR BLD AUTO: 6.3 % (ref 5–12)
NEUTROPHILS # BLD AUTO: 3.12 10*3/MM3 (ref 1.7–7)
NEUTROPHILS NFR BLD AUTO: 54.4 % (ref 42.7–76)
NRBC BLD AUTO-RTO: 0 /100 WBC (ref 0–0.2)
PLATELET # BLD AUTO: 250 10*3/MM3 (ref 140–450)
PMV BLD AUTO: 12.8 FL (ref 6–12)
POTASSIUM BLD-SCNC: 4.2 MMOL/L (ref 3.5–5.2)
PROT SERPL-MCNC: 8 G/DL (ref 6–8.5)
RBC # BLD AUTO: 4.49 10*6/MM3 (ref 4.14–5.8)
SODIUM BLD-SCNC: 137 MMOL/L (ref 136–145)
WBC NRBC COR # BLD: 5.74 10*3/MM3 (ref 3.4–10.8)

## 2020-03-09 PROCEDURE — 80061 LIPID PANEL: CPT | Performed by: NURSE PRACTITIONER

## 2020-03-09 PROCEDURE — 82570 ASSAY OF URINE CREATININE: CPT

## 2020-03-09 PROCEDURE — 85025 COMPLETE CBC W/AUTO DIFF WBC: CPT

## 2020-03-09 PROCEDURE — 36415 COLL VENOUS BLD VENIPUNCTURE: CPT

## 2020-03-09 PROCEDURE — 82043 UR ALBUMIN QUANTITATIVE: CPT

## 2020-03-09 PROCEDURE — 83036 HEMOGLOBIN GLYCOSYLATED A1C: CPT | Performed by: NURSE PRACTITIONER

## 2020-03-09 PROCEDURE — 80053 COMPREHEN METABOLIC PANEL: CPT

## 2020-03-10 DIAGNOSIS — E11.9 CONTROLLED TYPE 2 DIABETES MELLITUS WITHOUT COMPLICATION, WITHOUT LONG-TERM CURRENT USE OF INSULIN (HCC): Primary | ICD-10-CM

## 2020-03-11 ENCOUNTER — OFFICE VISIT (OUTPATIENT)
Dept: FAMILY MEDICINE CLINIC | Facility: CLINIC | Age: 55
End: 2020-03-11

## 2020-03-11 VITALS
HEIGHT: 71 IN | BODY MASS INDEX: 34.41 KG/M2 | SYSTOLIC BLOOD PRESSURE: 150 MMHG | HEART RATE: 74 BPM | TEMPERATURE: 98.4 F | RESPIRATION RATE: 18 BRPM | OXYGEN SATURATION: 97 % | WEIGHT: 245.8 LBS | DIASTOLIC BLOOD PRESSURE: 98 MMHG

## 2020-03-11 DIAGNOSIS — R74.8 ELEVATED LIVER ENZYMES: ICD-10-CM

## 2020-03-11 DIAGNOSIS — K76.0 FATTY LIVER: ICD-10-CM

## 2020-03-11 DIAGNOSIS — E78.2 MIXED HYPERLIPIDEMIA: ICD-10-CM

## 2020-03-11 DIAGNOSIS — D64.9 ANEMIA, UNSPECIFIED TYPE: ICD-10-CM

## 2020-03-11 DIAGNOSIS — E11.65 UNCONTROLLED TYPE 2 DIABETES MELLITUS WITH HYPERGLYCEMIA (HCC): Primary | ICD-10-CM

## 2020-03-11 DIAGNOSIS — Z80.0 FAMILY HISTORY OF COLORECTAL CANCER: ICD-10-CM

## 2020-03-11 DIAGNOSIS — I10 ESSENTIAL HYPERTENSION: ICD-10-CM

## 2020-03-11 PROCEDURE — 99214 OFFICE O/P EST MOD 30 MIN: CPT | Performed by: NURSE PRACTITIONER

## 2020-03-11 NOTE — ASSESSMENT & PLAN NOTE
Fasting glucose is elevated.  A1c is pending.  He has not been watching diet or exercising recently.  He is going to increase his physical activity and watch diet better.  Will call with A1c results and further recommendations.  Will likely need to increase metformin dose.

## 2020-03-11 NOTE — ASSESSMENT & PLAN NOTE
Likely due to fatty liver disease.  Discussed other possibilities.  If not improving with weight loss, we will have him see GI again.

## 2020-03-11 NOTE — ASSESSMENT & PLAN NOTE
He has seen GI for this in the past.  Encouraged healthy diet, regular physical activity, weight loss.  Recheck liver tests in 3 months.

## 2020-03-11 NOTE — PROGRESS NOTES
Chief Complaint   Patient presents with   • Diabetes   • Hyperlipidemia   • Hypertension        Subjective     Angelo Nicole  has a past medical history of Anemia, Asthma, Coronary vasospasm (CMS/HCC), Diabetes type 2, controlled (CMS/HCC), Family history of colorectal cancer (3/11/2020), Fatty liver, GERD (gastroesophageal reflux disease), H/O seasonal allergies, Hemangioma of liver, adenomatous colonic polyps, Hyperlipidemia, Hypertension, Obstructive sleep apnea, and Wasp sting-induced anaphylaxis.    Diabetes   He presents for his follow-up diabetic visit. He has type 2 diabetes mellitus. His disease course has been worsening. There are no hypoglycemic associated symptoms. Pertinent negatives for hypoglycemia include no dizziness. There are no diabetic associated symptoms. Pertinent negatives for diabetes include no blurred vision, no chest pain, no fatigue, no polydipsia, no polyphagia, no polyuria and no weight loss. There are no hypoglycemic complications. Symptoms are stable. There are no diabetic complications. Risk factors for coronary artery disease include diabetes mellitus, dyslipidemia, male sex, obesity, hypertension, sedentary lifestyle, stress and family history. Current diabetic treatment includes oral agent (monotherapy). He is compliant with treatment all of the time. His weight is stable. He is following a generally unhealthy diet. When asked about meal planning, he reported none. He participates in exercise intermittently. His home blood glucose trend is increasing steadily. His breakfast blood glucose range is generally 130-140 mg/dl. An ACE inhibitor/angiotensin II receptor blocker is being taken.   Hyperlipidemia   This is a chronic problem. The current episode started more than 1 year ago. Recent lipid tests were reviewed and are variable. Exacerbating diseases include diabetes and obesity. Pertinent negatives include no chest pain, myalgias or shortness of breath. Compliance problems  include adherence to diet and adherence to exercise.    Hypertension   This is a chronic problem. The current episode started more than 1 year ago. The problem has been gradually worsening since onset. The problem is uncontrolled. Pertinent negatives include no blurred vision, chest pain, palpitations or shortness of breath. Current antihypertension treatment includes ACE inhibitors and calcium channel blockers. The current treatment provides moderate improvement. Compliance problems include diet and exercise.        Allergies   Allergen Reactions   • Fluticasone Other (See Comments)         Current Outpatient Medications:   •  albuterol sulfate HFA (VENTOLIN HFA) 108 (90 Base) MCG/ACT inhaler, VENTOLIN  (90 Base) MCG/ACT AERS, Disp: , Rfl:   •  amLODIPine (NORVASC) 5 MG tablet, TAKE ONE TABLET BY MOUTH DAILY, Disp: 90 tablet, Rfl: 0  •  aspirin 81 MG tablet, ASPIRIN 81 MG ORAL TABLET, Disp: , Rfl:   •  BREO ELLIPTA 200-25 MCG/INH inhaler, , Disp: , Rfl:   •  cetirizine (ZYRTEC ALLERGY) 10 MG tablet, Daily., Disp: , Rfl:   •  EPINEPHrine (EPIPEN 2-JEOVANNY) 0.3 MG/0.3ML solution auto-injector injection, EPIPEN 2-JEOVANNY 0.3 MG/0.3ML SOAJ, Disp: , Rfl:   •  lisinopril (PRINIVIL,ZESTRIL) 10 MG tablet, TAKE ONE TABLET BY MOUTH DAILY, Disp: 90 tablet, Rfl: 0  •  metFORMIN (GLUCOPHAGE) 500 MG tablet, TAKE ONE TABLET BY MOUTH DAILY WITH A MEAL, Disp: 90 tablet, Rfl: 0  •  mometasone (NASONEX) 50 MCG/ACT nasal spray, NASONEX 50 MCG/ACT SUSP, Disp: , Rfl:   •  montelukast (SINGULAIR) 10 MG tablet, TAKE ONE TABLET BY MOUTH EVERY EVENING, Disp: 90 tablet, Rfl: 0  •  nitroglycerin (NITROSTAT) 0.4 MG SL tablet, NITROSTAT 0.4 MG SUBL, Disp: , Rfl:   •  raNITIdine (ZANTAC) 150 MG tablet, TAKE ONE TABLET BY MOUTH TWICE A DAY, Disp: 60 tablet, Rfl: 2    Patient Active Problem List   Diagnosis   • Adenomatous polyp of colon   • Allergic rhinitis   • Anemia   • Asthma   • Bee sting allergy   • Coronary vasospasm (CMS/HCC)   • Diabetes  mellitus type 2, uncontrolled (CMS/HCC)   • Elevated liver enzymes   • Fatigue   • Fatty liver   • Gastroesophageal reflux disease   • Hemangioma of liver   • Hyperlipidemia   • Hypertension   • Hypokalemia   • Obstructive sleep apnea   • Family history of colorectal cancer        Past Surgical History:   Procedure Laterality Date   • CARDIAC CATHETERIZATION  2011   • COLONOSCOPY  2013    Colon polyps       Social History     Socioeconomic History   • Marital status:      Spouse name: Not on file   • Number of children: Not on file   • Years of education: Not on file   • Highest education level: Not on file   Tobacco Use   • Smoking status: Never Smoker   • Smokeless tobacco: Never Used   Substance and Sexual Activity   • Alcohol use: No     Frequency: Never   • Drug use: No       Family History   Problem Relation Age of Onset   • Diabetes Mother    • Heart disease Mother    • Hypertension Mother    • Stroke Mother    • Kidney disease Mother    • Colon cancer Father        Family history, surgical history, past medical history, Allergies and med's reviewed with patient today and updated in [facility] EMR.     ROS:  Review of Systems   Constitutional: Negative for activity change, appetite change, diaphoresis, fatigue, unexpected weight gain and unexpected weight loss.   Eyes: Negative for blurred vision and visual disturbance.   Respiratory: Negative for apnea, cough, shortness of breath and wheezing.    Cardiovascular: Negative for chest pain, palpitations and leg swelling.   Gastrointestinal: Negative for abdominal pain, constipation, diarrhea, nausea and vomiting.   Endocrine: Negative for cold intolerance, heat intolerance, polydipsia, polyphagia and polyuria.   Genitourinary: Negative for frequency.   Musculoskeletal: Negative for myalgias.   Skin: Negative for rash.   Neurological: Negative for dizziness, syncope, numbness and headache.   Psychiatric/Behavioral: Negative for depressed mood.  "      OBJECTIVE:  Vitals:    03/11/20 0802   BP: 150/98   BP Location: Right arm   Patient Position: Sitting   Cuff Size: Large Adult   Pulse: 74   Resp: 18   Temp: 98.4 °F (36.9 °C)   TempSrc: Oral   SpO2: 97%   Weight: 111 kg (245 lb 12.8 oz)   Height: 180.3 cm (71\")     Body mass index is 34.28 kg/m².    Physical Exam   Constitutional: He is oriented to person, place, and time. He appears well-developed and well-nourished.   Neck: Trachea normal and normal range of motion. Neck supple. Carotid bruit is not present. No thyromegaly present.   Cardiovascular: Normal rate, regular rhythm, normal heart sounds and intact distal pulses. Exam reveals no gallop and no friction rub.   No murmur heard.  Pulmonary/Chest: Effort normal and breath sounds normal. He has no wheezes. He has no rales.   Abdominal: Soft. Bowel sounds are normal. There is no hepatosplenomegaly. There is no tenderness. No hernia.   Musculoskeletal: Normal range of motion. He exhibits no edema.   Lymphadenopathy:     He has no cervical adenopathy.   Neurological: He is alert and oriented to person, place, and time.   Skin: Skin is warm and dry.   Psychiatric: He has a normal mood and affect. His behavior is normal. Judgment and thought content normal.       Appointment on 03/09/2020   Component Date Value Ref Range Status   • Microalbumin/Creatinine Ratio 03/09/2020 314.7  mg/g Final   • Creatinine, Urine 03/09/2020 80.7  mg/dL Final   • Microalbumin, Urine 03/09/2020 25.4  mg/dL Final   • Glucose 03/09/2020 305* 65 - 99 mg/dL Final   • BUN 03/09/2020 16  6 - 20 mg/dL Final   • Creatinine 03/09/2020 1.09  0.76 - 1.27 mg/dL Final   • Sodium 03/09/2020 137  136 - 145 mmol/L Final   • Potassium 03/09/2020 4.2  3.5 - 5.2 mmol/L Final   • Chloride 03/09/2020 99  98 - 107 mmol/L Final   • CO2 03/09/2020 24.5  22.0 - 29.0 mmol/L Final   • Calcium 03/09/2020 9.8  8.6 - 10.5 mg/dL Final   • Total Protein 03/09/2020 8.0  6.0 - 8.5 g/dL Final   • Albumin " 03/09/2020 4.60  3.50 - 5.20 g/dL Final   • ALT (SGPT) 03/09/2020 76* 1 - 41 U/L Final   • AST (SGOT) 03/09/2020 43* 1 - 40 U/L Final   • Alkaline Phosphatase 03/09/2020 127* 39 - 117 U/L Final   • Total Bilirubin 03/09/2020 0.5  0.2 - 1.2 mg/dL Final   • eGFR  African Amer 03/09/2020 85  >60 mL/min/1.73 Final   • Globulin 03/09/2020 3.4  gm/dL Final   • A/G Ratio 03/09/2020 1.4  g/dL Final   • BUN/Creatinine Ratio 03/09/2020 14.7  7.0 - 25.0 Final   • Anion Gap 03/09/2020 13.5  5.0 - 15.0 mmol/L Final   • WBC 03/09/2020 5.74  3.40 - 10.80 10*3/mm3 Final   • RBC 03/09/2020 4.49  4.14 - 5.80 10*6/mm3 Final   • Hemoglobin 03/09/2020 13.9  13.0 - 17.7 g/dL Final   • Hematocrit 03/09/2020 40.5  37.5 - 51.0 % Final   • MCV 03/09/2020 90.2  79.0 - 97.0 fL Final   • MCH 03/09/2020 31.0  26.6 - 33.0 pg Final   • MCHC 03/09/2020 34.3  31.5 - 35.7 g/dL Final   • RDW 03/09/2020 12.7  12.3 - 15.4 % Final   • RDW-SD 03/09/2020 41.5  37.0 - 54.0 fl Final   • MPV 03/09/2020 12.8* 6.0 - 12.0 fL Final   • Platelets 03/09/2020 250  140 - 450 10*3/mm3 Final   • Neutrophil % 03/09/2020 54.4  42.7 - 76.0 % Final   • Lymphocyte % 03/09/2020 35.7  19.6 - 45.3 % Final   • Monocyte % 03/09/2020 6.3  5.0 - 12.0 % Final   • Eosinophil % 03/09/2020 2.8  0.3 - 6.2 % Final   • Basophil % 03/09/2020 0.5  0.0 - 1.5 % Final   • Immature Grans % 03/09/2020 0.3  0.0 - 0.5 % Final   • Neutrophils, Absolute 03/09/2020 3.12  1.70 - 7.00 10*3/mm3 Final   • Lymphocytes, Absolute 03/09/2020 2.05  0.70 - 3.10 10*3/mm3 Final   • Monocytes, Absolute 03/09/2020 0.36  0.10 - 0.90 10*3/mm3 Final   • Eosinophils, Absolute 03/09/2020 0.16  0.00 - 0.40 10*3/mm3 Final   • Basophils, Absolute 03/09/2020 0.03  0.00 - 0.20 10*3/mm3 Final   • Immature Grans, Absolute 03/09/2020 0.02  0.00 - 0.05 10*3/mm3 Final   • nRBC 03/09/2020 0.0  0.0 - 0.2 /100 WBC Final       ASSESSMENT/ PLAN:    Diagnoses and all orders for this visit:    1. Uncontrolled type 2 diabetes mellitus  with hyperglycemia (CMS/HCC) (Primary)  Assessment & Plan:  Fasting glucose is elevated.  A1c is pending.  He has not been watching diet or exercising recently.  He is going to increase his physical activity and watch diet better.  Will call with A1c results and further recommendations.  Will likely need to increase metformin dose.    Orders:  -     Hemoglobin A1c    2. Mixed hyperlipidemia  Assessment & Plan:  Lipid panel is pending.  Encouraged him to restart healthy eating and regular physical activity.  Will call with lab results and further recommendations.    He has declined statin therapy in the past, we will revisit this issue when I call him with lab results.    Orders:  -     Lipid Panel    3. Essential hypertension  Assessment & Plan:  He reports normal readings at home, however he has been using a wrist monitor.  He is to bring wrist monitor here or to 1 of the nurses at the hospital to check accuracy.  Recheck blood pressure in 1 week.      4. Fatty liver  Assessment & Plan:  He has seen GI for this in the past.  Encouraged healthy diet, regular physical activity, weight loss.  Recheck liver tests in 3 months.      5. Anemia, unspecified type  Assessment & Plan:  Resolved.      6. Elevated liver enzymes  Assessment & Plan:  Likely due to fatty liver disease.  Discussed other possibilities.  If not improving with weight loss, we will have him see GI again.      7. Family history of colorectal cancer  Assessment & Plan:  Reviewed recommendations for colonoscopy at least every 5 years.  He will be due in 2021.        Orders Placed Today:     No orders of the defined types were placed in this encounter.       Management Plan:     An After Visit Summary was printed and given to the patient at discharge.    Follow-up: Return in about 3 months (around 6/11/2020) for Follow-Up diabetes.    VIRGINIA Carlton 3/11/2020 09:58  This note was electronically signed.

## 2020-03-11 NOTE — ASSESSMENT & PLAN NOTE
He reports normal readings at home, however he has been using a wrist monitor.  He is to bring wrist monitor here or to 1 of the nurses at the hospital to check accuracy.  Recheck blood pressure in 1 week.

## 2020-03-11 NOTE — ASSESSMENT & PLAN NOTE
Lipid panel is pending.  Encouraged him to restart healthy eating and regular physical activity.  Will call with lab results and further recommendations.    He has declined statin therapy in the past, we will revisit this issue when I call him with lab results.

## 2020-03-12 LAB
CHOLEST SERPL-MCNC: 210 MG/DL (ref 0–200)
HBA1C MFR BLD: 10 % (ref 4.8–5.6)
HDLC SERPL-MCNC: 65 MG/DL (ref 40–60)
LDLC SERPL CALC-MCNC: 109 MG/DL (ref 0–100)
LDLC/HDLC SERPL: 1.68 {RATIO}
TRIGL SERPL-MCNC: 178 MG/DL (ref 0–150)
VLDLC SERPL-MCNC: 35.6 MG/DL

## 2020-03-13 ENCOUNTER — TELEPHONE (OUTPATIENT)
Dept: FAMILY MEDICINE CLINIC | Facility: CLINIC | Age: 55
End: 2020-03-13

## 2020-03-13 DIAGNOSIS — Z12.5 SCREENING FOR PROSTATE CANCER: ICD-10-CM

## 2020-03-13 DIAGNOSIS — E11.65 UNCONTROLLED TYPE 2 DIABETES MELLITUS WITH HYPERGLYCEMIA (HCC): ICD-10-CM

## 2020-03-13 DIAGNOSIS — E78.2 MIXED HYPERLIPIDEMIA: Primary | ICD-10-CM

## 2020-03-13 RX ORDER — ATORVASTATIN CALCIUM 10 MG/1
10 TABLET, FILM COATED ORAL DAILY
Qty: 90 TABLET | Refills: 1 | Status: SHIPPED | OUTPATIENT
Start: 2020-03-13 | End: 2020-10-22

## 2020-03-13 RX ORDER — METFORMIN HYDROCHLORIDE EXTENDED-RELEASE TABLETS 1000 MG/1
1000 TABLET, FILM COATED, EXTENDED RELEASE ORAL 2 TIMES DAILY WITH MEALS
Qty: 180 TABLET | Refills: 1 | Status: SHIPPED | OUTPATIENT
Start: 2020-03-13 | End: 2020-03-16

## 2020-03-16 ENCOUNTER — TELEPHONE (OUTPATIENT)
Dept: FAMILY MEDICINE CLINIC | Facility: CLINIC | Age: 55
End: 2020-03-16

## 2020-03-16 RX ORDER — METFORMIN HYDROCHLORIDE 500 MG/1
1000 TABLET, EXTENDED RELEASE ORAL 2 TIMES DAILY WITH MEALS
Qty: 360 TABLET | Refills: 1 | Status: SHIPPED | OUTPATIENT
Start: 2020-03-16 | End: 2020-11-09 | Stop reason: SDUPTHER

## 2020-03-16 NOTE — TELEPHONE ENCOUNTER
Yes, Metformin ER. I apologize.   Left message on machine informing patient of medication change.

## 2020-03-16 NOTE — TELEPHONE ENCOUNTER
Let patient know that I sent in Metformin Extended Release 500 mg tablets. He will need to two tablets twice a day (4 tablets total per day) with meals.

## 2020-03-16 NOTE — TELEPHONE ENCOUNTER
Pharmacy says that patients Metoprolol ER is expensive, they are wanting to know if you want to switch it to 500 mg? Please advise.

## 2020-03-19 RX ORDER — MONTELUKAST SODIUM 10 MG/1
TABLET ORAL
Qty: 90 TABLET | Refills: 1 | Status: SHIPPED | OUTPATIENT
Start: 2020-03-19 | End: 2020-11-09 | Stop reason: SDUPTHER

## 2020-03-30 RX ORDER — ALBUTEROL SULFATE 90 UG/1
AEROSOL, METERED RESPIRATORY (INHALATION)
Qty: 18 G | Refills: 0 | Status: SHIPPED | OUTPATIENT
Start: 2020-03-30

## 2020-03-30 RX ORDER — AMLODIPINE BESYLATE 5 MG/1
TABLET ORAL
Qty: 90 TABLET | Refills: 0 | Status: SHIPPED | OUTPATIENT
Start: 2020-03-30 | End: 2020-07-02

## 2020-03-30 RX ORDER — LISINOPRIL 10 MG/1
TABLET ORAL
Qty: 90 TABLET | Refills: 0 | Status: SHIPPED | OUTPATIENT
Start: 2020-03-30 | End: 2020-06-13 | Stop reason: DRUGHIGH

## 2020-04-23 ENCOUNTER — PATIENT MESSAGE (OUTPATIENT)
Dept: FAMILY MEDICINE CLINIC | Facility: CLINIC | Age: 55
End: 2020-04-23

## 2020-04-23 RX ORDER — FAMOTIDINE 20 MG/1
20 TABLET, FILM COATED ORAL 2 TIMES DAILY
Qty: 180 TABLET | Refills: 3 | Status: SHIPPED | OUTPATIENT
Start: 2020-04-23 | End: 2020-11-09 | Stop reason: SDUPTHER

## 2020-04-23 NOTE — TELEPHONE ENCOUNTER
From: Angelo Nicole  To: Marisela Lamb APRN  Sent: 4/23/2020 1:19 PM EDT  Subject: Prescription Question    Good afternoon,  I received a letter from [employer] ( my pharmacy benefit provider) that my current ranitidine prescription could be tainted with NDMA and the FDA has recommended to stop taking all ranitidine OTC and prescriptions.     Can you recommend an alternative product for me to take? And if I were exposed to NDMA, are there side effects I should look out for?    Thank you,  Angelo Nicole

## 2020-06-12 ENCOUNTER — OFFICE VISIT (OUTPATIENT)
Dept: FAMILY MEDICINE CLINIC | Facility: CLINIC | Age: 55
End: 2020-06-12

## 2020-06-12 VITALS
DIASTOLIC BLOOD PRESSURE: 99 MMHG | TEMPERATURE: 97.9 F | WEIGHT: 251 LBS | HEART RATE: 82 BPM | SYSTOLIC BLOOD PRESSURE: 168 MMHG | BODY MASS INDEX: 35.14 KG/M2 | HEIGHT: 71 IN | OXYGEN SATURATION: 99 % | RESPIRATION RATE: 18 BRPM

## 2020-06-12 DIAGNOSIS — E78.2 MIXED HYPERLIPIDEMIA: ICD-10-CM

## 2020-06-12 DIAGNOSIS — K76.0 FATTY LIVER: ICD-10-CM

## 2020-06-12 DIAGNOSIS — Z11.59 NEED FOR HEPATITIS C SCREENING TEST: ICD-10-CM

## 2020-06-12 DIAGNOSIS — I10 ESSENTIAL HYPERTENSION: ICD-10-CM

## 2020-06-12 DIAGNOSIS — Z23 NEED FOR VACCINATION: ICD-10-CM

## 2020-06-12 DIAGNOSIS — Z71.85 IMMUNIZATION COUNSELING: ICD-10-CM

## 2020-06-12 DIAGNOSIS — E66.01 CLASS 2 SEVERE OBESITY DUE TO EXCESS CALORIES WITH SERIOUS COMORBIDITY AND BODY MASS INDEX (BMI) OF 35.0 TO 35.9 IN ADULT (HCC): ICD-10-CM

## 2020-06-12 DIAGNOSIS — K21.9 GASTROESOPHAGEAL REFLUX DISEASE WITHOUT ESOPHAGITIS: ICD-10-CM

## 2020-06-12 DIAGNOSIS — E11.65 UNCONTROLLED TYPE 2 DIABETES MELLITUS WITH HYPERGLYCEMIA (HCC): Primary | ICD-10-CM

## 2020-06-12 DIAGNOSIS — R74.8 ELEVATED LIVER ENZYMES: ICD-10-CM

## 2020-06-12 DIAGNOSIS — Z12.5 SCREENING FOR PROSTATE CANCER: ICD-10-CM

## 2020-06-12 LAB — HBA1C MFR BLD: 8.5 %

## 2020-06-12 PROCEDURE — 83036 HEMOGLOBIN GLYCOSYLATED A1C: CPT | Performed by: NURSE PRACTITIONER

## 2020-06-12 PROCEDURE — 90732 PPSV23 VACC 2 YRS+ SUBQ/IM: CPT | Performed by: NURSE PRACTITIONER

## 2020-06-12 PROCEDURE — 99214 OFFICE O/P EST MOD 30 MIN: CPT | Performed by: NURSE PRACTITIONER

## 2020-06-12 PROCEDURE — 90471 IMMUNIZATION ADMIN: CPT | Performed by: NURSE PRACTITIONER

## 2020-06-12 NOTE — PROGRESS NOTES
Chief Complaint   Patient presents with   • Diabetes     [facility]   • Hypertension   • Hyperlipidemia   • Heartburn        Subjective     Angelo Nicole  has a past medical history of Anemia, Asthma, Coronary vasospasm (CMS/[facility]), Diabetes type 2, controlled (CMS/[facility]), Family history of colorectal cancer (3/11/2020), Fatty liver, GERD (gastroesophageal reflux disease), H/O seasonal allergies, Hemangioma of liver, adenomatous colonic polyps, Hyperlipidemia, Hypertension, Obstructive sleep apnea, and Wasp sting-induced anaphylaxis.    Diabetes   He presents for his follow-up diabetic visit. He has type 2 diabetes mellitus. No MedicAlert identification noted. The initial diagnosis of diabetes was made 1 years ago. His disease course has been improving. Pertinent negatives for hypoglycemia include no confusion, dizziness, headaches, hunger, mood changes, nervousness/anxiousness, pallor, seizures, sleepiness, speech difficulty, sweats or tremors. Associated symptoms include fatigue. Pertinent negatives for diabetes include no blurred vision, no chest pain, no foot paresthesias, no foot ulcerations, no polydipsia, no polyphagia, no polyuria, no visual change, no weakness and no weight loss. Pertinent negatives for hypoglycemia complications include no blackouts, no hospitalization, no nocturnal hypoglycemia, no required assistance and no required glucagon injection. Symptoms are improving. Pertinent negatives for diabetic complications include no CVA, heart disease, impotence, nephropathy, peripheral neuropathy, PVD or retinopathy. Risk factors for coronary artery disease include dyslipidemia, family history, hypertension, obesity, sedentary lifestyle, stress, male sex and diabetes mellitus. Current diabetic treatment includes diet and oral agent (monotherapy). He is compliant with treatment most of the time. His weight is stable. He is following a generally healthy diet. Meal planning includes  avoidance of concentrated sweets. He has not had a previous visit with a dietitian. He rarely participates in exercise. He monitors blood glucose at home 1-2 x per week. Blood glucose monitoring compliance is fair. His home blood glucose trend is decreasing steadily. His breakfast blood glucose is taken between 8-9 am. His breakfast blood glucose range is generally  mg/dl. His highest blood glucose is 110-130 mg/dl. His overall blood glucose range is 110-130 mg/dl. An ACE inhibitor/angiotensin II receptor blocker is being taken. He does not see a podiatrist.Eye exam is current.   Heartburn   He reports no abdominal pain, no chest pain, no coughing, no nausea or no wheezing. This is a chronic problem. The current episode started more than 1 year ago. The problem has been gradually improving. Associated symptoms include fatigue. Pertinent negatives include no weight loss. He has tried a PPI and a histamine-2 antagonist for the symptoms. The treatment provided significant relief.   Hyperlipidemia   This is a chronic problem. The current episode started more than 1 year ago. Exacerbating diseases include diabetes, liver disease and obesity. Pertinent negatives include no chest pain, myalgias or shortness of breath. Current antihyperlipidemic treatment includes statins and diet change. The current treatment provides significant improvement of lipids.   Hypertension   This is a chronic problem. The current episode started more than 1 year ago. The problem is unchanged. The problem is controlled. Pertinent negatives include no blurred vision, chest pain, headaches, palpitations, peripheral edema, shortness of breath or sweats. Current antihypertension treatment includes calcium channel blockers and ACE inhibitors. The current treatment provides significant improvement. Compliance problems include exercise.  There is no history of CVA, PVD or retinopathy.     Metformin dose was increased and Lipitor was started in  March. Has had some loose stools, but it is tolerable.   Walking on the weekends, but not exercising during the week.     Allergies   Allergen Reactions   • Fluticasone Other (See Comments)         Current Outpatient Medications:   •  amLODIPine (NORVASC) 5 MG tablet, TAKE ONE TABLET BY MOUTH DAILY, Disp: 90 tablet, Rfl: 0  •  aspirin 81 MG tablet, ASPIRIN 81 MG ORAL TABLET, Disp: , Rfl:   •  atorvastatin (LIPITOR) 10 MG tablet, Take 1 tablet by mouth Daily., Disp: 90 tablet, Rfl: 1  •  BREO ELLIPTA 200-25 MCG/INH inhaler, , Disp: , Rfl:   •  cetirizine (ZYRTEC ALLERGY) 10 MG tablet, Daily., Disp: , Rfl:   •  EPINEPHrine (EPIPEN 2-JEOVANNY) 0.3 MG/0.3ML solution auto-injector injection, EPIPEN 2-JEOVANNY 0.3 MG/0.3ML SOAJ, Disp: , Rfl:   •  famotidine (Pepcid) 20 MG tablet, Take 1 tablet by mouth 2 (Two) Times a Day., Disp: 180 tablet, Rfl: 3  •  metFORMIN ER (GLUCOPHAGE-XR) 500 MG 24 hr tablet, Take 2 tablets by mouth 2 (Two) Times a Day With Meals., Disp: 360 tablet, Rfl: 1  •  mometasone (NASONEX) 50 MCG/ACT nasal spray, NASONEX 50 MCG/ACT SUSP, Disp: , Rfl:   •  montelukast (SINGULAIR) 10 MG tablet, TAKE ONE TABLET BY MOUTH EVERY EVENING, Disp: 90 tablet, Rfl: 1  •  nitroglycerin (NITROSTAT) 0.4 MG SL tablet, NITROSTAT 0.4 MG SUBL, Disp: , Rfl:   •  VENTOLIN  (90 Base) MCG/ACT inhaler, INHALE TWO PUFFS BY MOUTH EVERY 4 HOURS AS NEEDED FOR SHORTNESS OF AIR, Disp: 18 g, Rfl: 0  •  lisinopril (PRINIVIL,ZESTRIL) 20 MG tablet, Take 1 tablet by mouth Daily., Disp: 90 tablet, Rfl: 1    Patient Active Problem List   Diagnosis   • Adenomatous polyp of colon   • Allergic rhinitis   • Anemia   • Asthma   • Bee sting allergy   • Coronary vasospasm (CMS/HCC)   • Diabetes mellitus type 2, uncontrolled (CMS/HCC)   • Elevated liver enzymes   • Fatty liver   • Gastroesophageal reflux disease   • Hemangioma of liver   • Hyperlipidemia   • Hypertension   • Obstructive sleep apnea   • Family history of colorectal cancer   • Class 2  severe obesity due to excess calories with serious comorbidity and body mass index (BMI) of 35.0 to 35.9 in adult (CMS/[facility])        Past Surgical History:   Procedure Laterality Date   • CARDIAC CATHETERIZATION  2011   • COLONOSCOPY  2013    Colon polyps       Social History     Socioeconomic History   • Marital status:      Spouse name: Not on file   • Number of children: Not on file   • Years of education: Not on file   • Highest education level: Not on file   Tobacco Use   • Smoking status: Never Smoker   • Smokeless tobacco: Never Used   Substance and Sexual Activity   • Alcohol use: No     Frequency: Never   • Drug use: No       Family History   Problem Relation Age of Onset   • Diabetes Mother    • Heart disease Mother    • Hypertension Mother    • Stroke Mother    • Kidney disease Mother    • Colon cancer Father        Family history, surgical history, past medical history, Allergies and med's reviewed with patient today and updated in [employer] EMR.     ROS:  Review of Systems   Constitutional: Positive for fatigue. Negative for activity change, appetite change, diaphoresis, unexpected weight gain and unexpected weight loss.   Eyes: Negative for blurred vision and visual disturbance.   Respiratory: Negative for cough, shortness of breath and wheezing.    Cardiovascular: Negative for chest pain, palpitations and leg swelling.   Gastrointestinal: Negative for abdominal pain, constipation, diarrhea, nausea and vomiting.   Endocrine: Negative for cold intolerance, heat intolerance, polydipsia, polyphagia and polyuria.   Genitourinary: Negative for frequency and impotence.   Musculoskeletal: Negative for myalgias.   Skin: Negative for pallor and rash.   Neurological: Negative for dizziness, tremors, seizures, syncope, speech difficulty, weakness, numbness, headache and confusion.   Psychiatric/Behavioral: Negative for depressed mood. The patient is not nervous/anxious.        OBJECTIVE:  Vitals:    06/12/20 0804  "  BP: 168/99   BP Location: Left arm   Patient Position: Sitting   Cuff Size: Adult   Pulse: 82   Resp: 18   Temp: 97.9 °F (36.6 °C)   TempSrc: Temporal   SpO2: 99%   Weight: 114 kg (251 lb)   Height: 180.3 cm (71\")     Body mass index is 35.01 kg/m².    Physical Exam   Constitutional: He is oriented to person, place, and time. He appears well-developed and well-nourished.   Neck: Trachea normal and normal range of motion. Neck supple. Carotid bruit is not present. No thyromegaly present.   Cardiovascular: Normal rate, regular rhythm, normal heart sounds and intact distal pulses. Exam reveals no gallop and no friction rub.   No murmur heard.  Pulmonary/Chest: Effort normal and breath sounds normal. He has no wheezes. He has no rales.   Abdominal: Soft. Bowel sounds are normal. There is no hepatosplenomegaly. There is no tenderness. No hernia.   Musculoskeletal: Normal range of motion. He exhibits no edema.    Angelo had a diabetic foot exam performed today.   During the foot exam he had a monofilament test performed.  Vascular Status -  His right foot exhibits normal foot vasculature  and no edema. His left foot exhibits normal foot vasculature  and no edema.  Skin Integrity  -  His right foot skin is intact.His left foot skin is intact..  Lymphadenopathy:     He has no cervical adenopathy.   Neurological: He is alert and oriented to person, place, and time.   Skin: Skin is warm and dry.   Psychiatric: He has a normal mood and affect. His behavior is normal. Judgment and thought content normal.       Office Visit on 06/12/2020   Component Date Value Ref Range Status   • Hemoglobin A1C 06/12/2020 8.5  % Final   • Glucose 06/12/2020 210* 65 - 99 mg/dL Final   • BUN 06/12/2020 15  6 - 24 mg/dL Final   • Creatinine 06/12/2020 0.96  0.76 - 1.27 mg/dL Final   • eGFR Non African Am 06/12/2020 87  >59 mL/min/1.73 Final   • eGFR African Am 06/12/2020 101  >59 mL/min/1.73 Final   • BUN/Creatinine Ratio 06/12/2020 16  9 - 20 " Final   • Sodium 06/12/2020 139  134 - 144 mmol/L Final   • Potassium 06/12/2020 4.3  3.5 - 5.2 mmol/L Final   • Chloride 06/12/2020 100  96 - 106 mmol/L Final   • Total CO2 06/12/2020 24  20 - 29 mmol/L Final   • Calcium 06/12/2020 9.5  8.7 - 10.2 mg/dL Final   • Total Protein 06/12/2020 7.7  6.0 - 8.5 g/dL Final   • Albumin 06/12/2020 4.6  3.8 - 4.9 g/dL Final   • Globulin 06/12/2020 3.1  1.5 - 4.5 g/dL Final   • A/G Ratio 06/12/2020 1.5  1.2 - 2.2 Final   • Total Bilirubin 06/12/2020 0.3  0.0 - 1.2 mg/dL Final   • Alkaline Phosphatase 06/12/2020 125* 39 - 117 IU/L Final   • AST (SGOT) 06/12/2020 45* 0 - 40 IU/L Final   • ALT (SGPT) 06/12/2020 74* 0 - 44 IU/L Final   • Total Cholesterol 06/12/2020 175  100 - 199 mg/dL Final   • Triglycerides 06/12/2020 112  0 - 149 mg/dL Final   • HDL Cholesterol 06/12/2020 65  >39 mg/dL Final   • VLDL Cholesterol 06/12/2020 22  5 - 40 mg/dL Final   • LDL Cholesterol  06/12/2020 88  0 - 99 mg/dL Final   • PSA 06/12/2020 0.6  0.0 - 4.0 ng/mL Final    Comment: Roche ECLIA methodology.  According to the American U[employer] Association, Serum PSA should  decrease and remain at undetectable levels after radical  prostatectomy. The AUA defines biochemical recurrence as an initial  PSA value 0.2 ng/mL or greater followed by a subsequent confirmatory  PSA value 0.2 ng/mL or greater.  Values obtained with different assay methods or kits cannot be used  interchangeably. Results cannot be interpreted as absolute evidence  of the presence or absence of malignant disease.       ASSESSMENT/ PLAN:    Diagnoses and all orders for this visit:    1. Uncontrolled type 2 diabetes mellitus with hyperglycemia (CMS/HCC) (Primary)  Comments:  He reports eye exam is up-to-date.  Will call for records.  Assessment & Plan:  A1c is improving.  He is having mild GI side effects with metformin ER, however he says this is tolerable at this time.  Encouraged him to continue to watch diet, increase his exercise,  and get weight down.  Discussed hepatitis B vaccinations, he believes he is already had these.  He will check his records at home at work.  Recheck in 3 months.    Orders:  -     Cancel: POC Urinalysis Dipstick, Automated  -     POC Glycosylated Hemoglobin (Hb A1C)  -     Comprehensive Metabolic Panel    2. Mixed hyperlipidemia  Assessment & Plan:  Tolerating statin therapy.  Will call with lipid results and further recommendations.    Orders:  -     Comprehensive Metabolic Panel  -     Lipid Panel    3. Essential hypertension  Assessment & Plan:  Elevated.  Increase lisinopril from 10 mg to 20 mg daily.    Orders:  -     Comprehensive Metabolic Panel    4. Gastroesophageal reflux disease without esophagitis  Assessment & Plan:  Doing well with switch to Pepcid.      5. Class 2 severe obesity due to excess calories with serious comorbidity and body mass index (BMI) of 35.0 to 35.9 in adult (CMS/[facility])  Assessment & Plan:  Weight is up 6 pounds since March.  Encouraged healthy diet and regular physical activity.  Discussed referral to dietitian, he will check with his wife to see if she is agreeable to this.      6. Screening for prostate cancer  -     PSA Screen    7. Need for vaccination  -     Pneumococcal Polysaccharide Vaccine 23-Valent Greater Than or Equal To 1yo Subcutaneous / IM    8. Fatty liver  Assessment & Plan:  Encouraged healthy diet, increase in physical activity, and weight loss.  Will call with lab results.      9. Elevated liver enzymes  Assessment & Plan:  Likely due to fatty liver disease.  Encouraged weight loss, healthy diet and exercise.  Will call with lab results and further recommendations.      10. Immunization counseling  Comments:  Recommended Tdap, Shingrix, hepatitis A and hep B vaccines.  He believes he has had some of these already.  He is going to check work and home records.      11. Need for hepatitis C screening test  Comments:  He is going to check to see if his insurance will  cover this and will let me now.    Other orders  -     lisinopril (PRINIVIL,ZESTRIL) 20 MG tablet; Take 1 tablet by mouth Daily.  Dispense: 90 tablet; Refill: 1      Orders Placed Today:     New Medications Ordered This Visit   Medications   • lisinopril (PRINIVIL,ZESTRIL) 20 MG tablet     Sig: Take 1 tablet by mouth Daily.     Dispense:  90 tablet     Refill:  1        Management Plan:     An After Visit Summary was printed and given to the patient at discharge.    Follow-up: Return in about 3 months (around 9/12/2020) for Follow-Up diabetes.    Marisela Lamb, [place] 6/13/2020 12:11  This note was electronically signed.

## 2020-06-12 NOTE — ASSESSMENT & PLAN NOTE
Weight is up 6 pounds since March.  Encouraged healthy diet and regular physical activity.  Discussed referral to dietitian, he will check with his wife to see if she is agreeable to this.

## 2020-06-13 ENCOUNTER — TELEPHONE (OUTPATIENT)
Dept: FAMILY MEDICINE CLINIC | Facility: CLINIC | Age: 55
End: 2020-06-13

## 2020-06-13 PROBLEM — E87.6 HYPOKALEMIA: Status: RESOLVED | Noted: 2017-07-18 | Resolved: 2020-06-13

## 2020-06-13 PROBLEM — R53.83 FATIGUE: Status: RESOLVED | Noted: 2018-05-25 | Resolved: 2020-06-13

## 2020-06-13 LAB
ALBUMIN SERPL-MCNC: 4.6 G/DL (ref 3.8–4.9)
ALBUMIN/GLOB SERPL: 1.5 {RATIO} (ref 1.2–2.2)
ALP SERPL-CCNC: 125 IU/L (ref 39–117)
ALT SERPL-CCNC: 74 IU/L (ref 0–44)
AST SERPL-CCNC: 45 IU/L (ref 0–40)
BILIRUB SERPL-MCNC: 0.3 MG/DL (ref 0–1.2)
BUN SERPL-MCNC: 15 MG/DL (ref 6–24)
BUN/CREAT SERPL: 16 (ref 9–20)
CALCIUM SERPL-MCNC: 9.5 MG/DL (ref 8.7–10.2)
CHLORIDE SERPL-SCNC: 100 MMOL/L (ref 96–106)
CHOLEST SERPL-MCNC: 175 MG/DL (ref 100–199)
CO2 SERPL-SCNC: 24 MMOL/L (ref 20–29)
CREAT SERPL-MCNC: 0.96 MG/DL (ref 0.76–1.27)
GLOBULIN SER CALC-MCNC: 3.1 G/DL (ref 1.5–4.5)
GLUCOSE SERPL-MCNC: 210 MG/DL (ref 65–99)
HDLC SERPL-MCNC: 65 MG/DL
LDLC SERPL CALC-MCNC: 88 MG/DL (ref 0–99)
POTASSIUM SERPL-SCNC: 4.3 MMOL/L (ref 3.5–5.2)
PROT SERPL-MCNC: 7.7 G/DL (ref 6–8.5)
PSA SERPL-MCNC: 0.6 NG/ML (ref 0–4)
SODIUM SERPL-SCNC: 139 MMOL/L (ref 134–144)
TRIGL SERPL-MCNC: 112 MG/DL (ref 0–149)
VLDLC SERPL CALC-MCNC: 22 MG/DL (ref 5–40)

## 2020-06-13 RX ORDER — LISINOPRIL 20 MG/1
20 TABLET ORAL DAILY
Qty: 90 TABLET | Refills: 1 | Status: SHIPPED | OUTPATIENT
Start: 2020-06-13 | End: 2020-09-16

## 2020-06-13 NOTE — TELEPHONE ENCOUNTER
Let patient know that I missed that his blood pressure was elevated at his visit last week. I want him to increase his Lisinopril to 20 mg daily (he can take 2 of the 10 mg pills at a time until they are gone). I'm sending in a new prescription for the 20 mg dose.

## 2020-06-13 NOTE — ASSESSMENT & PLAN NOTE
A1c is improving.  He is having mild GI side effects with metformin ER, however he says this is tolerable at this time.  Encouraged him to continue to watch diet, increase his exercise, and get weight down.  Discussed hepatitis B vaccinations, he believes he is already had these.  He will check his records at home at work.  Recheck in 3 months.

## 2020-06-13 NOTE — ASSESSMENT & PLAN NOTE
Encouraged healthy diet, increase in physical activity, and weight loss.  Will call with lab results.

## 2020-06-13 NOTE — ASSESSMENT & PLAN NOTE
Likely due to fatty liver disease.  Encouraged weight loss, healthy diet and exercise.  Will call with lab results and further recommendations.

## 2020-06-15 ENCOUNTER — TELEPHONE (OUTPATIENT)
Dept: FAMILY MEDICINE CLINIC | Facility: CLINIC | Age: 55
End: 2020-06-15

## 2020-06-15 DIAGNOSIS — E11.65 UNCONTROLLED TYPE 2 DIABETES MELLITUS WITH HYPERGLYCEMIA (HCC): ICD-10-CM

## 2020-06-15 DIAGNOSIS — K76.0 FATTY LIVER: Primary | ICD-10-CM

## 2020-06-15 NOTE — TELEPHONE ENCOUNTER
----- Message from [place] Carlton sent at 6/12/2020  8:30 AM EDT -----  Regarding: Diabetic eye exam  [facility] ([facility]

## 2020-06-15 NOTE — TELEPHONE ENCOUNTER
----- Message from [place] Carlton sent at 6/13/2020 11:27 AM EDT -----  Please go over lab results with patient.   1. His blood sugar was 210. Normal is < 100.   2. His liver tests were about the same. (go over numbers with him)  3. His cholesterol numbers are much better on Lipitor (go over numbrs with him)  4. PSA was normal.   I want him to focus on his diet and exercise like we discussed. We will check the liver tests and A1c again in 3 months. See if he wants to do these at the hospital a few days before his appointment - if so order a CMP and A1c for him to do there.

## 2020-07-02 RX ORDER — AMLODIPINE BESYLATE 5 MG/1
TABLET ORAL
Qty: 90 TABLET | Refills: 1 | Status: SHIPPED | OUTPATIENT
Start: 2020-07-02 | End: 2020-11-09 | Stop reason: SDUPTHER

## 2020-09-16 ENCOUNTER — OFFICE VISIT (OUTPATIENT)
Dept: FAMILY MEDICINE CLINIC | Facility: CLINIC | Age: 55
End: 2020-09-16

## 2020-09-16 VITALS
SYSTOLIC BLOOD PRESSURE: 156 MMHG | HEIGHT: 71 IN | RESPIRATION RATE: 18 BRPM | WEIGHT: 243 LBS | HEART RATE: 77 BPM | BODY MASS INDEX: 34.02 KG/M2 | OXYGEN SATURATION: 100 % | DIASTOLIC BLOOD PRESSURE: 88 MMHG | TEMPERATURE: 98.1 F

## 2020-09-16 DIAGNOSIS — E11.65 UNCONTROLLED TYPE 2 DIABETES MELLITUS WITH HYPERGLYCEMIA (HCC): Primary | ICD-10-CM

## 2020-09-16 DIAGNOSIS — K76.0 FATTY LIVER: ICD-10-CM
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
## 2020-09-16 DIAGNOSIS — R74.8 ELEVATED LIVER ENZYMES: ICD-10-CM

## 2020-09-16 DIAGNOSIS — I10 ESSENTIAL HYPERTENSION: ICD-10-CM

## 2020-09-16 DIAGNOSIS — M79.10 MYALGIA: ICD-10-CM

## 2020-09-16 DIAGNOSIS — E66.09 CLASS 1 OBESITY DUE TO EXCESS CALORIES WITH SERIOUS COMORBIDITY AND BODY MASS INDEX (BMI) OF 33.0 TO 33.9 IN ADULT: ICD-10-CM

## 2020-09-16 LAB — HBA1C MFR BLD: 8.2 %

## 2020-09-16 PROCEDURE — 83036 HEMOGLOBIN GLYCOSYLATED A1C: CPT | Performed by: NURSE PRACTITIONER

## 2020-09-16 PROCEDURE — 99214 OFFICE O/P EST MOD 30 MIN: CPT | Performed by: NURSE PRACTITIONER

## 2020-09-16 RX ORDER — LISINOPRIL 40 MG/1
40 TABLET ORAL DAILY
Qty: 90 TABLET | Refills: 0 | Status: SHIPPED | OUTPATIENT
Start: 2020-09-16 | End: 2020-11-09 | Stop reason: SDUPTHER

## 2020-09-16 NOTE — PROGRESS NOTES
Chief Complaint   Patient presents with   • Diabetes   • Hypertension        Subjective     Angelo Nicole  has a past medical history of Anemia, Asthma, Coronary vasospasm (CMS/HCC), Diabetes type 2, controlled (CMS/[facility]), Family history of colorectal cancer (3/11/2020), Fatty liver, GERD (gastroesophageal reflux disease), H/O seasonal allergies, Hemangioma of liver, adenomatous colonic polyps, Hyperlipidemia, Hypertension, Obstructive sleep apnea, and Wasp sting-induced anaphylaxis.    Diabetes  He presents for his follow-up diabetic visit. He has type 2 diabetes mellitus. No MedicAlert identification noted. The initial diagnosis of diabetes was made 1 years ago. His disease course has been improving. Hypoglycemia symptoms include headaches. Pertinent negatives for hypoglycemia include no confusion, dizziness, hunger, mood changes, nervousness/anxiousness, pallor, seizures, sleepiness, speech difficulty, sweats or tremors. Associated symptoms include fatigue. Pertinent negatives for diabetes include no blurred vision, no chest pain, no foot paresthesias, no foot ulcerations, no polydipsia, no polyphagia, no polyuria, no visual change, no weakness and no weight loss. Pertinent negatives for hypoglycemia complications include no blackouts, no hospitalization, no nocturnal hypoglycemia, no required assistance and no required glucagon injection. Symptoms are improving. Pertinent negatives for diabetic complications include no CVA, heart disease, impotence, nephropathy, peripheral neuropathy, PVD or retinopathy. Risk factors for coronary artery disease include dyslipidemia, family history, hypertension, obesity, sedentary lifestyle, stress, male sex and diabetes mellitus. Current diabetic treatment includes diet and oral agent (monotherapy). He is compliant with treatment all of the time. His weight is decreasing steadily. He is following a generally healthy diet. Meal planning includes avoidance of concentrated sweets.  He has not had a previous visit with a dietitian. He participates in exercise weekly. He monitors blood glucose at home 1-2 x per week. Blood glucose monitoring compliance is fair. His home blood glucose trend is decreasing steadily. His breakfast blood glucose is taken between 8-9 am. His breakfast blood glucose range is generally  mg/dl. His overall blood glucose range is 110-130 mg/dl. An ACE inhibitor/angiotensin II receptor blocker is being taken. He does not see a podiatrist.Eye exam is current.   Hypertension  This is a chronic problem. The current episode started more than 1 year ago. The problem is unchanged. The problem is controlled. Associated symptoms include headaches. Pertinent negatives include no blurred vision, chest pain, palpitations, peripheral edema, shortness of breath or sweats. Current antihypertension treatment includes calcium channel blockers and ACE inhibitors. The current treatment provides significant improvement. There are no compliance problems.  There is no history of CVA, PVD or retinopathy.     Had tick bite on back in June 2020. No rash or fever. About a month later - Had headaches daily for about 2 weeks, now gone. Still has occasional fatigue. Muscle cramps in hands & legs occasionally.     Allergies   Allergen Reactions   • Fluticasone Other (See Comments)         Current Outpatient Medications:   •  amLODIPine (NORVASC) 5 MG tablet, TAKE ONE TABLET BY MOUTH DAILY, Disp: 90 tablet, Rfl: 1  •  aspirin 81 MG tablet, ASPIRIN 81 MG ORAL TABLET, Disp: , Rfl:   •  atorvastatin (LIPITOR) 10 MG tablet, Take 1 tablet by mouth Daily., Disp: 90 tablet, Rfl: 1  •  BREO ELLIPTA 200-25 MCG/INH inhaler, , Disp: , Rfl:   •  cetirizine (ZYRTEC ALLERGY) 10 MG tablet, Daily., Disp: , Rfl:   •  EPINEPHrine (EPIPEN 2-JEOVANNY) 0.3 MG/0.3ML solution auto-injector injection, EPIPEN 2-JEOVANNY 0.3 MG/0.3ML SOAJ, Disp: , Rfl:   •  famotidine (Pepcid) 20 MG tablet, Take 1 tablet by mouth 2 (Two) Times a  Day., Disp: 180 tablet, Rfl: 3  •  metFORMIN ER (GLUCOPHAGE-XR) 500 MG 24 hr tablet, Take 2 tablets by mouth 2 (Two) Times a Day With Meals., Disp: 360 tablet, Rfl: 1  •  mometasone (NASONEX) 50 MCG/ACT nasal spray, NASONEX 50 MCG/ACT SUSP, Disp: , Rfl:   •  montelukast (SINGULAIR) 10 MG tablet, TAKE ONE TABLET BY MOUTH EVERY EVENING, Disp: 90 tablet, Rfl: 1  •  nitroglycerin (NITROSTAT) 0.4 MG SL tablet, NITROSTAT 0.4 MG SUBL, Disp: , Rfl:   •  VENTOLIN  (90 Base) MCG/ACT inhaler, INHALE TWO PUFFS BY MOUTH EVERY 4 HOURS AS NEEDED FOR SHORTNESS OF AIR, Disp: 18 g, Rfl: 0  •  lisinopril (PRINIVIL,ZESTRIL) 40 MG tablet, Take 1 tablet by mouth Daily., Disp: 90 tablet, Rfl: 0    Patient Active Problem List   Diagnosis   • Adenomatous polyp of colon   • Allergic rhinitis   • Anemia   • Asthma   • Bee sting allergy   • Coronary vasospasm (CMS/HCC)   • Diabetes mellitus type 2, uncontrolled (CMS/HCC)   • Elevated liver enzymes   • Fatty liver   • Gastroesophageal reflux disease   • Hemangioma of liver   • Hyperlipidemia   • Hypertension   • Obstructive sleep apnea   • Family history of colorectal cancer   • Class 1 obesity due to excess calories with serious comorbidity and body mass index (BMI) of 33.0 to 33.9 in adult        Past Surgical History:   Procedure Laterality Date   • CARDIAC CATHETERIZATION  2011   • COLONOSCOPY  2013    Colon polyps       Social History     Socioeconomic History   • Marital status:      Spouse name: Not on file   • Number of children: Not on file   • Years of education: Not on file   • Highest education level: Not on file   Tobacco Use   • Smoking status: Never Smoker   • Smokeless tobacco: Never Used   Substance and Sexual Activity   • Alcohol use: No     Frequency: Never   • Drug use: No       Family History   Problem Relation Age of Onset   • Diabetes Mother    • Heart disease Mother    • Hypertension Mother    • Stroke Mother    • Kidney disease Mother    • Colon cancer  "Father        Family history, surgical history, past medical history, Allergies and med's reviewed with patient today and updated in [facility] EMR.     ROS:  Review of Systems   Constitutional: Positive for fatigue. Negative for activity change, appetite change, diaphoresis, unexpected weight gain and unexpected weight loss.   Eyes: Negative for blurred vision and visual disturbance.   Respiratory: Negative for apnea, cough, shortness of breath and wheezing.    Cardiovascular: Negative for chest pain, palpitations and leg swelling.   Gastrointestinal: Negative for abdominal pain, constipation, diarrhea, nausea and vomiting.   Endocrine: Negative for cold intolerance, heat intolerance, polydipsia, polyphagia and polyuria.   Genitourinary: Negative for frequency and impotence.   Musculoskeletal: Positive for myalgias. Negative for gait problem.   Skin: Negative for pallor and rash.   Neurological: Positive for headache. Negative for dizziness, tremors, seizures, syncope, speech difficulty, weakness, numbness and confusion.   Psychiatric/Behavioral: Negative for depressed mood. The patient is not nervous/anxious.        OBJECTIVE:  Vitals:    09/16/20 0750 09/16/20 0830   BP: 166/97 156/88   BP Location: Left arm    Patient Position: Sitting    Cuff Size: Large Adult    Pulse: 77    Resp: 18    Temp: 98.1 °F (36.7 °C)    TempSrc: Infrared    SpO2: 100%    Weight: 110 kg (243 lb)    Height: 180.3 cm (71\")      Body mass index is 33.89 kg/m².    Physical Exam  Constitutional:       Appearance: He is well-developed.   Neck:      Musculoskeletal: Normal range of motion and neck supple.      Thyroid: No thyromegaly.      Vascular: No carotid bruit.      Trachea: Trachea normal.   Cardiovascular:      Rate and Rhythm: Normal rate and regular rhythm.      Heart sounds: Normal heart sounds. No murmur. No friction rub. No gallop.    Pulmonary:      Effort: Pulmonary effort is normal.      Breath sounds: Normal breath sounds. No " wheezing or rales.   Abdominal:      General: Bowel sounds are normal.      Palpations: Abdomen is soft.      Tenderness: There is no abdominal tenderness.      Hernia: No hernia is present.   Musculoskeletal: Normal range of motion.   Lymphadenopathy:      Cervical: No cervical adenopathy.   Skin:     General: Skin is warm and dry.   Neurological:      Mental Status: He is alert and oriented to person, place, and time.   Psychiatric:         Behavior: Behavior normal.         Thought Content: Thought content normal.         Judgment: Judgment normal.         Office Visit on 09/16/2020   Component Date Value Ref Range Status   • Hemoglobin A1C 09/16/2020 8.2  % Final       ASSESSMENT/ PLAN:    Diagnoses and all orders for this visit:    1. Uncontrolled type 2 diabetes mellitus with hyperglycemia (CMS/[facility]) (Primary)  Assessment & Plan:  A1c is improving.  Still not to goal.  Discussed addition of another oral agent.  He would like to continue healthy diet and exercise for 3 more months.  Follow-up in 3 months.  If A1c is still above 7 we will consider adding another oral agent.    Orders:  -     POC Glycosylated Hemoglobin (Hb A1C)    2. Elevated liver enzymes  -     Comprehensive Metabolic Panel    3. Myalgia  -     CK    4. Essential hypertension  Assessment & Plan:  Still not to goal.  Increase lisinopril from 20 mg to 40 mg daily.  Follow-up in 3 months.      5. Class 1 obesity due to excess calories with serious comorbidity and body mass index (BMI) of 33.0 to 33.9 in adult  Assessment & Plan:  Congratulated on his weight loss.  Continue healthy diet and regular physical activity.      6. Fatty liver  Assessment & Plan:  Continue healthy diet, regular physical activity, and weight loss.  We will call with LFT results.      7. Tick bite, initial encounter  Comments:  Will call with lab results and further recommendations.  Orders:  -     [place] SF (IgG / M)  -     [place], Total Antibody Test / Reflex  -      Ehrlichia Antibody Panel    Other orders  -     lisinopril (PRINIVIL,ZESTRIL) 40 MG tablet; Take 1 tablet by mouth Daily.  Dispense: 90 tablet; Refill: 0      Orders Placed Today:     New Medications Ordered This Visit   Medications   • lisinopril (PRINIVIL,ZESTRIL) 40 MG tablet     Sig: Take 1 tablet by mouth Daily.     Dispense:  90 tablet     Refill:  0        Management Plan:     An After Visit Summary was printed and given to the patient at discharge.    Follow-up: Return in about 3 months (around 12/16/2020) for Follow-Up diabetes.    Marisela Lamb, [place] 9/16/2020 08:32 EDT  This note was electronically signed.

## 2020-09-16 NOTE — ASSESSMENT & PLAN NOTE
A1c is improving.  Still not to goal.  Discussed addition of another oral agent.  He would like to continue healthy diet and exercise for 3 more months.  Follow-up in 3 months.  If A1c is still above 7 we will consider adding another oral agent.

## 2020-09-16 NOTE — ASSESSMENT & PLAN NOTE
Continue healthy diet, regular physical activity, and weight loss.  We will call with LFT results.

## 2020-09-21 PROBLEM — Z86.19 H/O ROCKY MOUNTAIN SPOTTED FEVER: Status: ACTIVE | Noted: 2020-09-21

## 2020-09-21 LAB
A PHAGOCYTOPH IGG TITR SER IF: NEGATIVE {TITER}
A PHAGOCYTOPH IGM TITR SER IF: NEGATIVE {TITER}
ALBUMIN SERPL-MCNC: 4.4 G/DL (ref 3.8–4.9)
ALBUMIN/GLOB SERPL: 1.3 {RATIO} (ref 1.2–2.2)
ALP SERPL-CCNC: 122 IU/L (ref 39–117)
ALT SERPL-CCNC: 60 IU/L (ref 0–44)
AST SERPL-CCNC: 36 IU/L (ref 0–40)
B BURGDOR IGG+IGM SER-ACNC: <0.91 ISR (ref 0–0.9)
BILIRUB SERPL-MCNC: 0.5 MG/DL (ref 0–1.2)
BUN SERPL-MCNC: 15 MG/DL (ref 6–24)
BUN/CREAT SERPL: 14 (ref 9–20)
CALCIUM SERPL-MCNC: 9.9 MG/DL (ref 8.7–10.2)
CHLORIDE SERPL-SCNC: 100 MMOL/L (ref 96–106)
CK SERPL-CCNC: 228 U/L (ref 41–331)
CO2 SERPL-SCNC: 25 MMOL/L (ref 20–29)
CREAT SERPL-MCNC: 1.11 MG/DL (ref 0.76–1.27)
E CHAFFEENSIS IGG TITR SER IF: NEGATIVE {TITER}
E CHAFFEENSIS IGM TITR SER IF: NEGATIVE {TITER}
GLOBULIN SER CALC-MCNC: 3.3 G/DL (ref 1.5–4.5)
GLUCOSE SERPL-MCNC: 245 MG/DL (ref 65–99)
POTASSIUM SERPL-SCNC: 4.6 MMOL/L (ref 3.5–5.2)
PROT SERPL-MCNC: 7.7 G/DL (ref 6–8.5)
R RICKETTSI IGG SER QL IA: POSITIVE
R RICKETTSI IGG TITR SER IF: ABNORMAL {TITER}
R RICKETTSI IGM SER-ACNC: 0.49 INDEX (ref 0–0.89)
SODIUM SERPL-SCNC: 142 MMOL/L (ref 134–144)

## 2020-09-21 RX ORDER — DOXYCYCLINE 100 MG/1
100 CAPSULE ORAL 2 TIMES DAILY
Qty: 20 CAPSULE | Refills: 0 | Status: SHIPPED | OUTPATIENT
Start: 2020-09-21 | End: 2020-10-01

## 2020-10-22 RX ORDER — ATORVASTATIN CALCIUM 10 MG/1
TABLET, FILM COATED ORAL
Qty: 90 TABLET | Refills: 0 | Status: SHIPPED | OUTPATIENT
Start: 2020-10-22 | End: 2020-11-09 | Stop reason: SDUPTHER

## 2020-11-06 ENCOUNTER — PATIENT MESSAGE (OUTPATIENT)
Dept: FAMILY MEDICINE CLINIC | Facility: CLINIC | Age: 55
End: 2020-11-06

## 2020-11-09 RX ORDER — AMLODIPINE BESYLATE 5 MG/1
5 TABLET ORAL DAILY
Qty: 90 TABLET | Refills: 0 | Status: SHIPPED | OUTPATIENT
Start: 2020-11-09

## 2020-11-09 RX ORDER — LISINOPRIL 40 MG/1
40 TABLET ORAL DAILY
Qty: 90 TABLET | Refills: 0 | Status: SHIPPED | OUTPATIENT
Start: 2020-11-09

## 2020-11-09 RX ORDER — METFORMIN HYDROCHLORIDE 500 MG/1
1000 TABLET, EXTENDED RELEASE ORAL 2 TIMES DAILY WITH MEALS
Qty: 360 TABLET | Refills: 0 | Status: SHIPPED | OUTPATIENT
Start: 2020-11-09

## 2020-11-09 RX ORDER — ATORVASTATIN CALCIUM 10 MG/1
10 TABLET, FILM COATED ORAL DAILY
Qty: 90 TABLET | Refills: 0 | Status: SHIPPED | OUTPATIENT
Start: 2020-11-09

## 2020-11-09 RX ORDER — MONTELUKAST SODIUM 10 MG/1
10 TABLET ORAL EVERY EVENING
Qty: 90 TABLET | Refills: 0 | Status: SHIPPED | OUTPATIENT
Start: 2020-11-09

## 2020-11-09 RX ORDER — FAMOTIDINE 20 MG/1
20 TABLET, FILM COATED ORAL 2 TIMES DAILY
Qty: 180 TABLET | Refills: 0 | Status: SHIPPED | OUTPATIENT
Start: 2020-11-09

## 2020-11-09 NOTE — TELEPHONE ENCOUNTER
From: Angelo Nicole  To: [place] Carlton  Sent: 11/6/2020 3:46 PM EST  Subject: Prescription Question    Good afternoon Marisela,  I am leaving [facility] and heading to [place], [place]. My last day is November 13. Is it possible to get extra refills of my maintenance medications until I find a provider In [place]? Thanks for taking such great care of me, I am going to miss you and the whole team.  Angelo Nicole

## 2021-03-26 ENCOUNTER — BULK ORDERING (OUTPATIENT)
Dept: CASE MANAGEMENT | Facility: OTHER | Age: 56
End: 2021-03-26

## 2021-03-26 DIAGNOSIS — Z23 IMMUNIZATION DUE: ICD-10-CM
